# Patient Record
Sex: MALE | Race: BLACK OR AFRICAN AMERICAN | Employment: UNEMPLOYED | URBAN - METROPOLITAN AREA
[De-identification: names, ages, dates, MRNs, and addresses within clinical notes are randomized per-mention and may not be internally consistent; named-entity substitution may affect disease eponyms.]

---

## 2017-01-03 NOTE — TELEPHONE ENCOUNTER
Last Visit: 10/20/2016 with RENEA Roberts   Date of Surgery: 08/03/2016 revision of a failed left hip replacement     Next Appointment: 01/19/2017 with RENEA Roberts   Previous Refill Encounters: 12/07/2016 per RENEA Roberts #60     Requested Prescriptions     Pending Prescriptions Disp Refills    HYDROcodone-acetaminophen (NORCO)  mg tablet 60 Tab 0     Sig: Take 1-2 Tabs by mouth every eight (8) hours as needed for Pain. Max Daily Amount: 6 Tabs.

## 2017-01-04 RX ORDER — HYDROCODONE BITARTRATE AND ACETAMINOPHEN 10; 325 MG/1; MG/1
1-2 TABLET ORAL
Qty: 60 TAB | Refills: 0 | Status: SHIPPED | OUTPATIENT
Start: 2017-01-04 | End: 2017-01-19 | Stop reason: SDUPTHER

## 2017-01-19 ENCOUNTER — OFFICE VISIT (OUTPATIENT)
Dept: ORTHOPEDIC SURGERY | Facility: CLINIC | Age: 62
End: 2017-01-19

## 2017-01-19 VITALS
SYSTOLIC BLOOD PRESSURE: 144 MMHG | HEART RATE: 68 BPM | BODY MASS INDEX: 27.85 KG/M2 | DIASTOLIC BLOOD PRESSURE: 73 MMHG | HEIGHT: 69 IN | WEIGHT: 188 LBS | TEMPERATURE: 98.2 F

## 2017-01-19 DIAGNOSIS — Z96.642 HISTORY OF TOTAL LEFT HIP REPLACEMENT: Primary | ICD-10-CM

## 2017-01-19 RX ORDER — HYDROCODONE BITARTRATE AND ACETAMINOPHEN 10; 325 MG/1; MG/1
1-2 TABLET ORAL
Qty: 60 TAB | Refills: 0 | Status: SHIPPED | OUTPATIENT
Start: 2017-01-19 | End: 2017-02-20 | Stop reason: SDUPTHER

## 2017-01-19 RX ORDER — MELOXICAM 15 MG/1
TABLET ORAL
Qty: 30 TAB | Refills: 2 | Status: SHIPPED | OUTPATIENT
Start: 2017-01-19 | End: 2018-05-11

## 2017-01-19 RX ORDER — HYDROCODONE BITARTRATE AND ACETAMINOPHEN 5; 325 MG/1; MG/1
TABLET ORAL
COMMUNITY
End: 2017-08-10 | Stop reason: SDUPTHER

## 2017-01-19 NOTE — PROGRESS NOTES
[address], [address] [facility]  [phone]           Patient: Tiffanie Sandoval. MRN: [id number]       SSN: [id number]  YOB: 1955        AGE: 62 y.o. SEX: male  Body mass index is 27.76 kg/(m^2). PCP: Jorge Luis Okeefe MD  01/19/17      This office note has been dictated. REVIEW OF SYSTEMS:  Constitutional: Negative for fever, chills, weight loss and malaise/fatigue. HENT: Negative. Eyes: Negative. Respiratory: Negative. Cardiovascular: Negative. Gastrointestinal: No bowel incontinence or constipation. Genitourinary: No bladder incontinence or saddle anesthesia. Skin: Negative. Neurological: Negative. Endo/Heme/Allergies: Negative. Psychiatric/Behavioral: Negative. Musculoskeletal: As per HPI above. Past Medical History   Diagnosis Date    Arthritis     Balance problems     High cholesterol     Low back pain     Migraine     Right hip pain     Sleep apnea      does not use cpap         Current Outpatient Prescriptions:     HYDROcodone-acetaminophen (NORCO) 5-325 mg per tablet, Take  by mouth., Disp: , Rfl:     HYDROcodone-acetaminophen (NORCO)  mg tablet, Take 1-2 Tabs by mouth every eight (8) hours as needed for Pain. Max Daily Amount: 6 Tabs., Disp: 60 Tab, Rfl: 0    oxyCODONE-acetaminophen (PERCOCET 7.5) 7.5-325 mg per tablet, 1-2 po q 8 hrs prn pain, Disp: 60 Tab, Rfl: 0    ferrous sulfate 325 mg (65 mg iron) tablet, Take 1 Tab by mouth two (2) times daily (with meals). , Disp: 60 Tab, Rfl: 2    aspirin (ASPIRIN) 325 mg tablet, Take 1 Tab by mouth two (2) times a day., Disp: 60 Tab, Rfl: 0    No Known Allergies    Social History     Social History    Marital status:      Spouse name: N/A    Number of children: N/A    Years of education: N/A     Occupational History    Not on file.      Social History Main Topics    Smoking status: Never Smoker    Smokeless tobacco: Never Used    Alcohol use 3.6 oz/week     6 Cans of beer per week      Comment: week    Drug use: Yes     Special: Marijuana      Comment: last 7/19/2015    Sexual activity: Not on file     Other Topics Concern    Not on file     Social History Narrative       Past Surgical History   Procedure Laterality Date    Hx refractive surgery      Hx hip replacement Bilateral     Hx other surgical       Lumbar Compression    Hx orthopaedic       right hip replacement, left hip replacement    Hx heent       laser eye surgery left eye, prosthetic right eye               We did see Mr. Ty Rodriguez for follow-up in regards to revision left hip replacement. The patient is now five months status post surgery and doing quite well. He is quite happy with the results. He is having minimal discomfort, just some stiffness especially in the morning. He is currently taking no antiinflammatories. He has had no recent fevers, chills, systemic changes, or injuries to report. PHYSICAL EXAMINATION: In general the patient is alert and oriented x 3 and is in no acute distress. The patient is well-developed and well-nourished with a normal affect. The patient is afebrile. HEENT:  Head is normocephalic and atraumatic. Pupils are equally round and reactive to light and accommodation. Extraocular eye movements are intact. Neck is supple. Trachea is midline. No JVD is present. Breathing is nonlabored. Examination of the lower extremities reveals pain free range of motion of the hips. The surgical wound on the left hip has healed nicely. There is no pain with palpation to the trochanteric bursae. Abduction strength is 5/5 and symmetric bilaterally. There is negative calf tenderness and swelling. There is negative Homans. There is no evidence of DVT noted. Leg lengths are perfect. ASSESSMENT:  Status post revision left hip replacement.      PLAN:  At this point I am going to start him on Mobic once per day with food. He is given a refill of his pain medicine. We will see him back in seven months time for reevaluation and X-ray of the left hip. He will call with any questions or concerns that shall arise.              JR Roman RAMIREZ, PA-C, ATC

## 2017-02-20 DIAGNOSIS — Z96.642 HISTORY OF TOTAL LEFT HIP REPLACEMENT: ICD-10-CM

## 2017-02-20 NOTE — TELEPHONE ENCOUNTER
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 Max Daily Amount: 6 Tabs.

## 2017-02-22 RX ORDER — HYDROCODONE BITARTRATE AND ACETAMINOPHEN 10; 325 MG/1; MG/1
1-2 TABLET ORAL
Qty: 60 TAB | Refills: 0 | Status: SHIPPED | OUTPATIENT
Start: 2017-02-22 | End: 2017-04-14 | Stop reason: SDUPTHER

## 2017-03-17 RX ORDER — HYDROCODONE BITARTRATE AND ACETAMINOPHEN 10; 325 MG/1; MG/1
1-2 TABLET ORAL
Qty: 60 TAB | Refills: 0 | Status: SHIPPED | OUTPATIENT
Start: 2017-03-17 | End: 2018-01-23 | Stop reason: SDUPTHER

## 2017-03-17 NOTE — TELEPHONE ENCOUNTER
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 Max Daily Amount: 6 Tabs.

## 2017-04-14 DIAGNOSIS — Z96.642 HISTORY OF TOTAL LEFT HIP REPLACEMENT: ICD-10-CM

## 2017-04-14 RX ORDER — HYDROCODONE BITARTRATE AND ACETAMINOPHEN 10; 325 MG/1; MG/1
1-2 TABLET ORAL
Qty: 60 TAB | Refills: 0 | Status: SHIPPED | OUTPATIENT
Start: 2017-04-14 | End: 2017-05-03 | Stop reason: SDUPTHER

## 2017-04-14 NOTE — TELEPHONE ENCOUNTER
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 Max Daily Amount: 6 Tabs.

## 2017-04-14 NOTE — TELEPHONE ENCOUNTER
Attempted to inform pt of his Rx being ready for pickup at [facility] office.  Unable to leave a message

## 2017-05-03 DIAGNOSIS — Z96.642 HISTORY OF TOTAL LEFT HIP REPLACEMENT: ICD-10-CM

## 2017-05-03 RX ORDER — HYDROCODONE BITARTRATE AND ACETAMINOPHEN 10; 325 MG/1; MG/1
1-2 TABLET ORAL
Qty: 60 TAB | Refills: 0 | Status: SHIPPED | OUTPATIENT
Start: 2017-05-03 | End: 2017-05-24 | Stop reason: SDUPTHER

## 2017-05-03 NOTE — TELEPHONE ENCOUNTER
Last Visit: 01/19/2017 with RENEA Foreman   Date of Surgery: 08/03/2016 left hip revision  Next Appointment: 08/10/2017 with RENEA Foreman   Previous Refill Encounters: 04/14/2017 per RENEA Turcios #60     Requested Prescriptions     Pending Prescriptions Disp Refills    HYDROcodone-acetaminophen (NORCO)  mg tablet 60 Tab 0     Sig: Take 1-2 Tabs by mouth every eight (8) hours as needed for Pain. Max Daily Amount: 6 Tabs.

## 2017-05-24 DIAGNOSIS — Z96.642 HISTORY OF TOTAL LEFT HIP REPLACEMENT: ICD-10-CM

## 2017-05-24 NOTE — TELEPHONE ENCOUNTER
Last Visit: 01/19/2017 with RENEA Mccarthy   Date of Surgery: 08/03/2016 left hip revision  Next Appointment: 08/10/2017 with RENEA Mccarthy   Previous Refill Encounters: 05/03/2017 per RENEA Mccarthy #60     Requested Prescriptions     Pending Prescriptions Disp Refills    HYDROcodone-acetaminophen (NORCO)  mg tablet 60 Tab 0     Sig: Take 1-2 Tabs by mouth every eight (8) hours as needed for Pain. Max Daily Amount: 6 Tabs.

## 2017-05-25 RX ORDER — HYDROCODONE BITARTRATE AND ACETAMINOPHEN 10; 325 MG/1; MG/1
1-2 TABLET ORAL
Qty: 60 TAB | Refills: 0 | Status: SHIPPED | OUTPATIENT
Start: 2017-05-25 | End: 2017-06-26 | Stop reason: SDUPTHER

## 2017-06-26 DIAGNOSIS — Z96.642 HISTORY OF TOTAL LEFT HIP REPLACEMENT: ICD-10-CM

## 2017-06-26 NOTE — TELEPHONE ENCOUNTER
Last Visit: 01/19/2017 with RENEA Levin   Date of Surgery: 08/03/2016 left hip revision  Next Appointment: 08/10/2017 with RENEA Levin   Previous Refill Encounters: 05/25/2017 per RENEA Levin #60     Requested Prescriptions     Pending Prescriptions Disp Refills    HYDROcodone-acetaminophen (NORCO)  mg tablet 60 Tab 0     Sig: Take 1-2 Tabs by mouth every eight (8) hours as needed for Pain. Max Daily Amount: 6 Tabs.

## 2017-06-28 RX ORDER — HYDROCODONE BITARTRATE AND ACETAMINOPHEN 10; 325 MG/1; MG/1
1-2 TABLET ORAL
Qty: 60 TAB | Refills: 0 | Status: SHIPPED | OUTPATIENT
Start: 2017-06-28 | End: 2017-07-28 | Stop reason: SDUPTHER

## 2017-07-28 DIAGNOSIS — Z96.642 HISTORY OF TOTAL LEFT HIP REPLACEMENT: ICD-10-CM

## 2017-07-28 RX ORDER — HYDROCODONE BITARTRATE AND ACETAMINOPHEN 10; 325 MG/1; MG/1
1-2 TABLET ORAL
Qty: 21 TAB | Refills: 0 | Status: SHIPPED | OUTPATIENT
Start: 2017-07-28 | End: 2018-01-23 | Stop reason: SDUPTHER

## 2017-07-28 NOTE — TELEPHONE ENCOUNTER
Last Visit: 01/19/2017 with RENEA Quintana   Date of Surgery: 08/03/2016 left hip revision  Next Appointment: 08/10/2017 with RENEA Quintana   Previous Refill Encounters: 06/28/2017 per RENEA Martin #60     Requested Prescriptions     Pending Prescriptions Disp Refills    HYDROcodone-acetaminophen (NORCO)  mg tablet 60 Tab 0     Sig: Take 1-2 Tabs by mouth every eight (8) hours as needed for Pain. Max Daily Amount: 6 Tabs.

## 2017-07-28 NOTE — TELEPHONE ENCOUNTER
Notify patient only able to provide a one week rx for pain meds. Notify patient we will be unable to continue with narcotic refills due to federal regulatory laws. Can refer to pain management.

## 2017-07-28 NOTE — TELEPHONE ENCOUNTER
Called and spoke to pt. He can come to the [facility] office for rx and he is aware we can not prescribe anymore refills.

## 2017-08-10 ENCOUNTER — OFFICE VISIT (OUTPATIENT)
Dept: ORTHOPEDIC SURGERY | Facility: CLINIC | Age: 62
End: 2017-08-10

## 2017-08-10 VITALS
HEIGHT: 69 IN | OXYGEN SATURATION: 100 % | SYSTOLIC BLOOD PRESSURE: 147 MMHG | TEMPERATURE: 98.1 F | RESPIRATION RATE: 18 BRPM | BODY MASS INDEX: 26.78 KG/M2 | WEIGHT: 180.8 LBS | HEART RATE: 51 BPM | DIASTOLIC BLOOD PRESSURE: 77 MMHG

## 2017-08-10 DIAGNOSIS — Z96.642 S/P HIP REPLACEMENT, LEFT: Primary | ICD-10-CM

## 2017-08-10 DIAGNOSIS — Z96.641 HISTORY OF TOTAL RIGHT HIP REPLACEMENT: ICD-10-CM

## 2017-08-10 RX ORDER — HYDROCODONE BITARTRATE AND ACETAMINOPHEN 5; 325 MG/1; MG/1
1 TABLET ORAL
Qty: 30 TAB | Refills: 0 | Status: SHIPPED | OUTPATIENT
Start: 2017-08-10 | End: 2017-09-21 | Stop reason: SDUPTHER

## 2017-08-10 NOTE — PROGRESS NOTES
[address], [address] [place] [address]  [phone]           Patient: Tito Burciaga. MRN: [id number]       SSN: [id number]  YOB: 1955        AGE: 62 y.o. SEX: male  Body mass index is 26.7 kg/(m^2). PCP: Rodriguez Henderson MD  08/10/17      This office note has been dictated. REVIEW OF SYSTEMS:  Constitutional: Negative for fever, chills, weight loss and malaise/fatigue. HENT: Negative. Eyes: Negative. Respiratory: Negative. Cardiovascular: Negative. Gastrointestinal: No bowel incontinence or constipation. Genitourinary: No bladder incontinence or saddle anesthesia. Skin: Negative. Neurological: Negative. Endo/Heme/Allergies: Negative. Psychiatric/Behavioral: Negative. Musculoskeletal: As per HPI above. Past Medical History:   Diagnosis Date    Arthritis     Balance problems     High cholesterol     Low back pain     Migraine     Right hip pain     Sleep apnea     does not use cpap         Current Outpatient Prescriptions:     HYDROcodone-acetaminophen (NORCO) 5-325 mg per tablet, Take 1 Tab by mouth every six (6) hours as needed for Pain. Max Daily Amount: 4 Tabs., Disp: 30 Tab, Rfl: 0    aspirin (ASPIRIN) 325 mg tablet, Take 1 Tab by mouth two (2) times a day., Disp: 60 Tab, Rfl: 0    HYDROcodone-acetaminophen (NORCO)  mg tablet, Take 1-2 Tabs by mouth every eight (8) hours as needed for Pain. Max Daily Amount: 6 Tabs., Disp: 21 Tab, Rfl: 0    HYDROcodone-acetaminophen (NORCO)  mg tablet, Take 1-2 Tabs by mouth every eight (8) hours as needed for Pain.  Max Daily Amount: 6 Tabs., Disp: 60 Tab, Rfl: 0    meloxicam (MOBIC) 15 mg tablet, 1 tab by mouth daily with food, Disp: 30 Tab, Rfl: 2    oxyCODONE-acetaminophen (PERCOCET 7.5) 7.5-325 mg per tablet, 1-2 po q 8 hrs prn pain, Disp: 60 Tab, Rfl: 0    ferrous sulfate 325 mg (65 mg iron) tablet, Take 1 Tab by mouth two (2) times daily (with meals). , Disp: 60 Tab, Rfl: 2    No Known Allergies    Social History     Social History    Marital status:      Spouse name: N/A    Number of children: N/A    Years of education: N/A     Occupational History    Not on file. Social History Main Topics    Smoking status: Never Smoker    Smokeless tobacco: Never Used    Alcohol use 3.6 oz/week     6 Cans of beer per week      Comment: week    Drug use: Yes     Special: Marijuana      Comment: last 7/19/2015    Sexual activity: Not on file     Other Topics Concern    Not on file     Social History Narrative       Past Surgical History:   Procedure Laterality Date    HX HEENT      laser eye surgery left eye, prosthetic right eye    HX HIP REPLACEMENT Bilateral     HX ORTHOPAEDIC      right hip replacement, left hip replacement    HX OTHER SURGICAL      Lumbar Compression    HX REFRACTIVE SURGERY                 We did see Mr. Jesus Purcell for followup with regards to his bilateral hips. He is status post bilateral hip replacement. We did a revision of the left one about one year ago for poly exchange. He is doing extremely well. He is very happy with the results and having no pain. He takes about one-half of a pain pill at night. He is out doing his normal daily activities without complications. PHYSICAL EXAMINATION: In general, the patient is alert and oriented x 3 and is in no acute distress. The patient is well-developed and well-nourished with a normal affect. The patient is afebrile. HEENT:  Head is normocephalic and atraumatic. Pupils are equally round and reactive to light and accommodation. Extraocular eye movements are intact. Neck is supple. Trachea is midline. No JVD is present. Breathing is nonlabored. Examination of the lower extremities reveals pain-free range of motion of the hips. There is no pain to palpation of the trochanteric bursae.  There is a negative straight leg raise, negative calf tenderness, and negative Isabellas sign. There are no signs of DVT present. Abduction strength is symmetric bilaterally at 5/5. RADIOGRAPHS:  Radiographs in the office today, AP of the pelvis and AP and cross table of each of the hips show total knee components are well-fixed with no evidence of loosening or fracture noted. He does have some heterotopic ossification of the greater trochanter seen on the left hip. ASSESSMENT:      1. Status post right hip replacement. 2. Status post left hip replacement. 3. Status post left hip revision with poly exchange. PLAN:  At this point, the patient has done extremely well. He will continue activities as tolerated. He was given a prescription for Norco, one every six hours #30 with no refills. He will see us back in one years time for evaluation. He will call with any questions or concerns that shall arise.            JR Roman RAMIREZ, PATyreeC, ATC

## 2017-09-21 ENCOUNTER — OFFICE VISIT (OUTPATIENT)
Dept: ORTHOPEDIC SURGERY | Age: 62
End: 2017-09-21

## 2017-09-21 VITALS
HEART RATE: 60 BPM | HEIGHT: 69 IN | BODY MASS INDEX: 26.96 KG/M2 | TEMPERATURE: 97 F | SYSTOLIC BLOOD PRESSURE: 136 MMHG | WEIGHT: 182 LBS | DIASTOLIC BLOOD PRESSURE: 82 MMHG

## 2017-09-21 DIAGNOSIS — Z96.642 S/P HIP REPLACEMENT, LEFT: ICD-10-CM

## 2017-09-21 DIAGNOSIS — M70.61 TROCHANTERIC BURSITIS OF BOTH HIPS: Primary | ICD-10-CM

## 2017-09-21 DIAGNOSIS — M70.62 TROCHANTERIC BURSITIS OF BOTH HIPS: Primary | ICD-10-CM

## 2017-09-21 RX ORDER — HYDROCODONE BITARTRATE AND ACETAMINOPHEN 5; 325 MG/1; MG/1
1 TABLET ORAL
Qty: 30 TAB | Refills: 0 | Status: SHIPPED | OUTPATIENT
Start: 2017-09-21 | End: 2017-11-03 | Stop reason: SDUPTHER

## 2017-09-21 RX ORDER — IBUPROFEN 200 MG
400 TABLET ORAL
COMMUNITY

## 2017-09-21 RX ORDER — ATORVASTATIN CALCIUM 40 MG/1
1 TABLET, FILM COATED ORAL
COMMUNITY
Start: 2017-08-25

## 2017-09-21 RX ORDER — CYCLOBENZAPRINE HCL 10 MG
10 TABLET ORAL
Qty: 60 TAB | Refills: 0 | Status: SHIPPED | OUTPATIENT
Start: 2017-09-21 | End: 2018-05-11

## 2017-09-21 RX ORDER — BETAMETHASONE SODIUM PHOSPHATE AND BETAMETHASONE ACETATE 3; 3 MG/ML; MG/ML
6 INJECTION, SUSPENSION INTRA-ARTICULAR; INTRALESIONAL; INTRAMUSCULAR; SOFT TISSUE ONCE
Qty: 2 ML | Refills: 0
Start: 2017-09-21 | End: 2017-09-21
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
## 2017-11-03 DIAGNOSIS — Z96.642 S/P HIP REPLACEMENT, LEFT: ICD-10-CM

## 2017-11-03 RX ORDER — HYDROCODONE BITARTRATE AND ACETAMINOPHEN 5; 325 MG/1; MG/1
1 TABLET ORAL
Qty: 30 TAB | Refills: 0 | Status: SHIPPED | OUTPATIENT
Start: 2017-11-03 | End: 2018-01-12 | Stop reason: SDUPTHER

## 2017-11-03 NOTE — TELEPHONE ENCOUNTER
Last Visit: 09/21/2017 with RENEA Laguerre    Next Appointment: noted to f/u in 3 months   Previous Refill Encounters: 09/21/2017 per RENEA Laguerre #30     Requested Prescriptions     Pending Prescriptions Disp Refills    HYDROcodone-acetaminophen (NORCO) 5-325 mg per tablet 30 Tab 0     Sig: Take 1 Tab by mouth every six (6) hours as needed for Pain. Max Daily Amount: 4 Tabs.

## 2017-12-06 DIAGNOSIS — Z96.642 S/P HIP REPLACEMENT, LEFT: ICD-10-CM

## 2017-12-06 RX ORDER — HYDROCODONE BITARTRATE AND ACETAMINOPHEN 5; 325 MG/1; MG/1
1 TABLET ORAL
Qty: 30 TAB | Refills: 0 | Status: CANCELLED | OUTPATIENT
Start: 2017-12-06

## 2017-12-27 RX ORDER — HYDROCODONE BITARTRATE AND ACETAMINOPHEN 5; 325 MG/1; MG/1
1 TABLET ORAL
Qty: 30 TAB | Refills: 0 | OUTPATIENT
Start: 2017-12-27

## 2017-12-27 NOTE — TELEPHONE ENCOUNTER
Last Visit: 09/21/2017 with RENEA Thompson    Next Appointment: noted to f/u in 3 months   Previous Refill Encounters: 11/03/2017 per RENEA Burciaga #30     Requested Prescriptions     Pending Prescriptions Disp Refills    HYDROcodone-acetaminophen (NORCO) 5-325 mg per tablet 30 Tab 0     Sig: Take 1 Tab by mouth every six (6) hours as needed for Pain. Max Daily Amount: 4 Tabs.

## 2018-01-12 ENCOUNTER — OFFICE VISIT (OUTPATIENT)
Dept: ORTHOPEDIC SURGERY | Age: 63
End: 2018-01-12

## 2018-01-12 VITALS
OXYGEN SATURATION: 98 % | HEIGHT: 69 IN | DIASTOLIC BLOOD PRESSURE: 67 MMHG | SYSTOLIC BLOOD PRESSURE: 142 MMHG | WEIGHT: 183.8 LBS | HEART RATE: 67 BPM | BODY MASS INDEX: 27.22 KG/M2

## 2018-01-12 DIAGNOSIS — Z96.642 S/P HIP REPLACEMENT, LEFT: ICD-10-CM

## 2018-01-12 DIAGNOSIS — M70.62 TROCHANTERIC BURSITIS OF LEFT HIP: Primary | ICD-10-CM

## 2018-01-12 DIAGNOSIS — M54.5 LOW BACK PAIN, UNSPECIFIED BACK PAIN LATERALITY, UNSPECIFIED CHRONICITY, WITH SCIATICA PRESENCE UNSPECIFIED: ICD-10-CM

## 2018-01-12 DIAGNOSIS — Z96.641 HISTORY OF TOTAL RIGHT HIP REPLACEMENT: ICD-10-CM

## 2018-01-12 DIAGNOSIS — Z96.642 HISTORY OF TOTAL LEFT HIP REPLACEMENT: ICD-10-CM

## 2018-01-12 RX ORDER — BETAMETHASONE SODIUM PHOSPHATE AND BETAMETHASONE ACETATE 3; 3 MG/ML; MG/ML
6 INJECTION, SUSPENSION INTRA-ARTICULAR; INTRALESIONAL; INTRAMUSCULAR; SOFT TISSUE ONCE
Qty: 1 ML | Refills: 0
Start: 2018-01-12 | End: 2018-01-12

## 2018-01-12 RX ORDER — HYDROCODONE BITARTRATE AND ACETAMINOPHEN 5; 325 MG/1; MG/1
1-2 TABLET ORAL
Qty: 42 TAB | Refills: 0 | Status: SHIPPED | OUTPATIENT
Start: 2018-01-12 | End: 2018-04-13 | Stop reason: SDUPTHER

## 2018-01-12 NOTE — PROGRESS NOTES
[address] [place] [address]  [phone]           Patient: Candace Almonte. MRN: [id number]       SSN: [id number]  YOB: 1955        AGE: 65 y.o. SEX: male  Body mass index is 27.14 kg/(m^2). PCP: Anthony Horner MD  01/12/18      This office note has been dictated. REVIEW OF SYSTEMS:  Constitutional: Negative for fever, chills, weight loss and malaise/fatigue. HENT: Negative. Eyes: Negative. Respiratory: Negative. Cardiovascular: Negative. Gastrointestinal: No bowel incontinence or constipation. Genitourinary: No bladder incontinence or saddle anesthesia. Skin: Negative. Neurological: Negative. Endo/Heme/Allergies: Negative. Psychiatric/Behavioral: Negative. Musculoskeletal: As per HPI above. Past Medical History:   Diagnosis Date    Arthritis     Balance problems     High cholesterol     Low back pain     Migraine     Right hip pain     Sleep apnea     does not use cpap         Current Outpatient Prescriptions:     HYDROcodone-acetaminophen (NORCO) 5-325 mg per tablet, Take 1 Tab by mouth every six (6) hours as needed for Pain. Max Daily Amount: 4 Tabs., Disp: 30 Tab, Rfl: 0    ibuprofen (MOTRIN) 200 mg tablet, Take 400 mg by mouth every six (6) hours as needed for Pain., Disp: , Rfl:     atorvastatin (LIPITOR) 40 mg tablet, Take 1 Tab by mouth., Disp: , Rfl:     cyclobenzaprine (FLEXERIL) 10 mg tablet, Take 1 Tab by mouth three (3) times daily as needed for Muscle Spasm(s). , Disp: 60 Tab, Rfl: 0    HYDROcodone-acetaminophen (NORCO)  mg tablet, Take 1-2 Tabs by mouth every eight (8) hours as needed for Pain. Max Daily Amount: 6 Tabs., Disp: 21 Tab, Rfl: 0    HYDROcodone-acetaminophen (NORCO)  mg tablet, Take 1-2 Tabs by mouth every eight (8) hours as needed for Pain.  Max Daily Amount: 6 Tabs., Disp: 60 Tab, Rfl: 0    meloxicam (MOBIC) 15 mg tablet, 1 tab by mouth daily with food, Disp: 30 Tab, Rfl: 2    oxyCODONE-acetaminophen (PERCOCET 7.5) 7.5-325 mg per tablet, 1-2 po q 8 hrs prn pain, Disp: 60 Tab, Rfl: 0    ferrous sulfate 325 mg (65 mg iron) tablet, Take 1 Tab by mouth two (2) times daily (with meals). , Disp: 60 Tab, Rfl: 2    aspirin (ASPIRIN) 325 mg tablet, Take 1 Tab by mouth two (2) times a day., Disp: 60 Tab, Rfl: 0    No Known Allergies    Social History     Social History    Marital status:      Spouse name: N/A    Number of children: N/A    Years of education: N/A     Occupational History    Not on file. Social History Main Topics    Smoking status: Never Smoker    Smokeless tobacco: Never Used    Alcohol use 3.6 oz/week     6 Cans of beer per week      Comment: week    Drug use: Yes     Special: Marijuana      Comment: last 7/19/2015    Sexual activity: Not on file     Other Topics Concern    Not on file     Social History Narrative       Past Surgical History:   Procedure Laterality Date    HX HEENT      laser eye surgery left eye, prosthetic right eye    HX HIP REPLACEMENT Bilateral     HX ORTHOPAEDIC      right hip replacement, left hip replacement    HX OTHER SURGICAL      Lumbar Compression    HX REFRACTIVE SURGERY             * Patient was identified by name and date of birth   * Agreement on procedure being performed was verified  * Risks and Benefits explained to the patient  * Procedure site verified and marked as necessary  * Patient was positioned for comfort  * Consent was signed and verified  11:14 AM    The patient was instructed on post injection care. We did see Mr. Ewing Go for followup with regards to complaints of left hip pain. The patient reports a little laterally-based left hip pain but more thigh pain when he is up and ambulating. The further he walks, the more discomfort he gets. He also gets discomfort standing at the kitchen sink doing dishes and cooking.   He has some discomfort at night lying down. He has no pain with sitting. He denies any change in his bowel or bladder habits, and no he has had no fevers, chills, systemic changes, or injuries to report. He does continue on Norco and would like a refill of it. PHYSICAL EXAMINATION:  In general, the patient is alert and oriented x 3 in no acute distress. The patient is well-developed, well-nourished, with a normal affect. The patient is afebrile. HEENT:  Head is normocephalic and atraumatic. Pupils are equally round and reactive to light and accommodation. Extraocular eye movements are intact. Neck is supple. Trachea is midline. No JVD is present. Breathing is nonlabored. Examination of the back reveals the skin is intact. There is no ecchymosis, no warmth, and no signs of infection or cellulitis present. There is no pain with palpation of the midline lumbar spine, as well as paraspinally. The hips have good range of motion without discomfort. There is discomfort to palpation to the trochanteric bursa on the left side. The surgical wounds are healed up nicely. There is no ecchymosis, no warmth, and no signs of infection or cellulitis present. There is negative cellulitis, negative straight leg raise, and negative calf tenderness. He has slight decreased sensation to L2-3 to the left lower extremity. RADIOGRAPHS:  Radiographs in the office today, including AP and lateral of the lumbar spine, shows multilevel degenerative changes. She does have a history of an L2 compression fracture. She does have some degenerative disc disease noted as well at multiple levels. AP of the pelvis and AP and cross table lateral of the left hip, shows total knee components are well-fixed. He does have some bridging osteophyte, heterotopic ossification off the greater trochanter to the inferior iliac spine. It is not fully ossified across. No acute abnormality is noted.      ASSESSMENT:      1. Status post revision left knee replacement. 2. Trochanteric bursitis left hip. 3. Lumbar radiculopathy. PLAN:  At this point, we discussed treatment options. We are going to move forward with a cortisone injection for the left hip today. Under aseptic conditions, after informed and written consent, and appropriate time out performed, with ultrasound-guided assistance, the left hip was prepped with Betadine and 6 mg of Celestone was injected without complications. The patient tolerated the injection well. The patient was instructed on post injection care. We will get him back to see Dr. Kaylah Menon with regards to his back. I think he would benefit from repeat epidural steroid injections. I think that half of his discomfort is coming from radiculopathy. He was given a refill of his pain medicine today in the office. We will see him back in about three months time evaluation.                           JR Roman RAMIREZ, PATyreeC, ATC

## 2018-01-23 ENCOUNTER — OFFICE VISIT (OUTPATIENT)
Dept: ORTHOPEDIC SURGERY | Age: 63
End: 2018-01-23

## 2018-01-23 VITALS
SYSTOLIC BLOOD PRESSURE: 134 MMHG | HEIGHT: 69 IN | DIASTOLIC BLOOD PRESSURE: 78 MMHG | HEART RATE: 62 BPM | BODY MASS INDEX: 26.22 KG/M2 | WEIGHT: 177 LBS

## 2018-01-23 DIAGNOSIS — M47.817 LUMBOSACRAL SPONDYLOSIS WITHOUT MYELOPATHY: Primary | ICD-10-CM

## 2018-01-23 DIAGNOSIS — M51.36 DEGENERATIVE LUMBAR DISC: ICD-10-CM

## 2018-01-23 DIAGNOSIS — M51.36 OTHER INTERVERTEBRAL DISC DEGENERATION, LUMBAR REGION: ICD-10-CM

## 2018-01-23 RX ORDER — GABAPENTIN 300 MG/1
300 CAPSULE ORAL 3 TIMES DAILY
Qty: 90 CAP | Refills: 1 | Status: SHIPPED | OUTPATIENT
Start: 2018-01-23 | End: 2018-05-11

## 2018-01-23 NOTE — MR AVS SNAPSHOT
[facility] 
 
 
 [address] [facility] 
[phone] Patient: Zeferino Mckeon. MRN: [id number] :1955 Visit Information Date & Time Provider Department Dept. Phone Encounter #  
 2018  8:35 AM Eleuterio Gould  [facility], [address] [facility] [place] [phone] [id number] Follow-up Instructions Return in about 4 weeks (around 2018). Your Appointments 2018 10:30 AM  
Follow Up with Stefano Allen PA-C  
[facility] 1 ([facility]) Appt Note: lt hip 3 mo fu  
 [address], [address] [facility] [address]  
[phone] [address] [facility] Upcoming Health Maintenance Date Due Hepatitis C Screening 1955 DTaP/Tdap/Td series (1 - Tdap) 1977 FOBT Q 1 YEAR AGE 50-75 2006 ZOSTER VACCINE AGE 60> 10/12/2016 Influenza Age 5 to Adult 2017 Allergies as of 2018  Review Complete On: 2018 By: Eleuterio Gould MD  
 No Known Allergies Current Immunizations  Never Reviewed No immunizations on file. Not reviewed this visit You Were Diagnosed With   
  
 Codes Comments Lumbosacral spondylosis without myelopathy    -  Primary ICD-10-CM: I38.592 ICD-9-CM: 721.3 Degenerative lumbar disc     ICD-10-CM: M51.36 
ICD-9-CM: 722.52 Other intervertebral disc degeneration, lumbar region     ICD-10-CM: M51.36 
ICD-9-CM: 722.52 Vitals BP Pulse Height(growth percentile) Weight(growth percentile) BMI Smoking Status 134/78 62 5' 9\" (1.753 m) 177 lb (80.3 kg) 26.14 kg/m2 Never Smoker Vitals History BMI and BSA Data Body Mass Index Body Surface Area  
 26.14 kg/m 2 1.98 m 2 Preferred Pharmacy Pharmacy Name Phone  [id number] [phone] Your Updated Medication List  
  
   
This list is accurate as of: 1/23/18  9:47 AM.  Always use your most recent med list.  
  
  
  
  
 aspirin 325 mg tablet Commonly known as:  ASPIRIN Take 1 Tab by mouth two (2) times a day. cyclobenzaprine 10 mg tablet Commonly known as:  FLEXERIL Take 1 Tab by mouth three (3) times daily as needed for Muscle Spasm(s). ferrous sulfate 325 mg (65 mg iron) tablet Take 1 Tab by mouth two (2) times daily (with meals). gabapentin 300 mg capsule Commonly known as:  NEURONTIN Take 1 Cap by mouth three (3) times daily. HYDROcodone-acetaminophen 5-325 mg per tablet Commonly known as:  Janine Punt Take 1-2 Tabs by mouth every eight (8) hours as needed for Pain. Max Daily Amount: 6 Tabs. ibuprofen 200 mg tablet Commonly known as:  MOTRIN Take 400 mg by mouth every six (6) hours as needed for Pain. LIPITOR 40 mg tablet Generic drug:  atorvastatin Take 1 Tab by mouth.  
  
 meloxicam 15 mg tablet Commonly known as:  MOBIC  
1 tab by mouth daily with food Prescriptions Sent to Pharmacy Refills  
 gabapentin (NEURONTIN) 300 mg capsule 1 Sig: Take 1 Cap by mouth three (3) times daily. Class: Normal  
 [facility] [facility] [phone] Route: Oral  
  
Follow-up Instructions Return in about 4 weeks (around 2/20/2018). Introducing [address]! Justina Hua introduces [employer] patient portal. Now you can access parts of your medical record, email your doctor's office, and request medication refills online. 1. In your internet browser, go to [email or web address] 2. Click on the First Time User? Click Here link in the Sign In box. You will see the New Member Sign Up page. 3. Enter your [employer] Access Code exactly as it appears below.  You will not need to use this code after youve completed the sign-up process. If you do not sign up before the expiration date, you must request a new code. · [employer] Access Code: [email or web address] Expires: 4/12/2018 10:45 AM 
 
4. Enter the last four digits of your Social Security Number (xxxx) and Date of Birth (mm/dd/yyyy) as indicated and click Submit. You will be taken to the next sign-up page. 5. Create a [employer] ID. This will be your [employer] login ID and cannot be changed, so think of one that is secure and easy to remember. 6. Create a [employer] password. You can change your password at any time. 7. Enter your Password Reset Question and Answer. This can be used at a later time if you forget your password. 8. Enter your e-mail address. You will receive e-mail notification when new information is available in [address] 9. Click Sign Up. You can now view and download portions of your medical record. 10. Click the Download Summary menu link to download a portable copy of your medical information. If you have questions, please visit the Frequently Asked Questions section of the [employer] website. Remember, [employer] is NOT to be used for urgent needs. For medical emergencies, dial 911. Now available from your iPhone and Android! Please provide this summary of care documentation to your next provider. Your primary care clinician is listed as Dionicio James. If you have any questions after today's visit, please call [phone].

## 2018-01-23 NOTE — PROGRESS NOTES
[facility]  [address], [place]   Phone: [phone]  Fax: [phone]        PROGRESS NOTE      HISTORY OF PRESENT ILLNESS:  The patient is a 65 y.o. male and was seen today for follow up of low back pain extending in roughly an L5 distribution into the bilateral lower extremities. He endorses low back pain first thing in the morning. The patient states he is doing well. He is followed by James Duong. Wayne MEIER for bilateral total hip replacement. Note from Dr. Yajaira Fontaine dated 3/14/16 indicates patient needs a new left hip replacement. Patient states he plans to have left total hip replacement revision done after July. Note from Beth Mcdonnell dated 10/20/2016 reveals pt had follow up revision of left total hip replacement on 8/3/2016. Pt reports that since the hip replacement his pain has improved. Patient is prescribed Norco by Dr. Yajaira Fontaine. Pt states he stopped his Neurontin after his hip surgery and is doing well off the medication. He has completed PT. MRI dated 12/18/12. Per report revealed degenerative changes in the lumbar spine remaining relatively stable, most pronounced at L4-L5 where there may be mass effect upon the crossing L5 nerve roots bilaterally and moderate left-sided foraminal stenosis. At his last clinic appointment, pt discontinued his Neurontin 3 months ago without any increase in pain. He may continue off his medication. I recommend pt increase his HEP to daily. I will see the patient back PRN     The patient returns today with left hip pain extending circumferentially into the  LLE to the knee since January 2018. He rates pain 7/10, an increase since his last visit (4/10). No injury/trauma. He states his pain onset due to the recent cold weather. His pain is aggravated with elongated standing and alleviated when sitting down. Note from Jesus Oreilly dated 1/15/18 reviewed. His note indicated patient was seen with c/o left hip pain.  At that time he underwent a left trochanteric injection without benefit. Patient reports he will f/u with RENEA Srivastava in 3 months. He continues with his HEP 3x/wek.  reviewed. Body mass index is 26.14 kg/(m^2). Past Medical History:   Diagnosis Date    Arthritis     Balance problems     High cholesterol     Low back pain     Migraine     Right hip pain     Sleep apnea     does not use cpap        Social History     Social History    Marital status:      Spouse name: N/A    Number of children: N/A    Years of education: N/A     Occupational History    Not on file. Social History Main Topics    Smoking status: Never Smoker    Smokeless tobacco: Never Used    Alcohol use 3.6 oz/week     6 Cans of beer per week      Comment: week    Drug use: Yes     Special: Marijuana      Comment: last 7/19/2015    Sexual activity: Not on file     Other Topics Concern    Not on file     Social History Narrative       Current Outpatient Prescriptions   Medication Sig Dispense Refill    gabapentin (NEURONTIN) 300 mg capsule Take 1 Cap by mouth three (3) times daily. 90 Cap 1    HYDROcodone-acetaminophen (NORCO) 5-325 mg per tablet Take 1-2 Tabs by mouth every eight (8) hours as needed for Pain. Max Daily Amount: 6 Tabs. 42 Tab 0    ibuprofen (MOTRIN) 200 mg tablet Take 400 mg by mouth every six (6) hours as needed for Pain.  atorvastatin (LIPITOR) 40 mg tablet Take 1 Tab by mouth.  meloxicam (MOBIC) 15 mg tablet 1 tab by mouth daily with food 30 Tab 2    ferrous sulfate 325 mg (65 mg iron) tablet Take 1 Tab by mouth two (2) times daily (with meals). 60 Tab 2    aspirin (ASPIRIN) 325 mg tablet Take 1 Tab by mouth two (2) times a day. 60 Tab 0    cyclobenzaprine (FLEXERIL) 10 mg tablet Take 1 Tab by mouth three (3) times daily as needed for Muscle Spasm(s). (Patient not taking: Reported on 1/23/2018) 60 Tab 0       No Known Allergies     Ambulates with a single point cane.   PHYSICAL EXAMINATION    Visit Vitals    /78    Pulse 62    Ht 5' 9\" (1.753 m)    Wt 177 lb (80.3 kg)    BMI 26.14 kg/m2       CONSTITUTIONAL: NAD, A&O x 3  SENSATION: Intact to light touch throughout  NEURO: Franki's is negative bilaterally. RANGE OF MOTION: The patient has full passive range of motion in all four extremities. MOTOR:  Straight Leg Raise: Negative, bilateral               Hip Flex Knee Ext Knee Flex Ankle DF GTE Ankle PF Tone   Right +4/5 +4/5 +4/5 +4/5 +4/5 +4/5 +4/5   Left +4/5 +4/5 +4/5 +4/5 +4/5 +4/5 +4/5       ASSESSMENT   Diagnoses and all orders for this visit:    1. Lumbosacral spondylosis without myelopathy    2. Degenerative lumbar disc    3. Other intervertebral disc degeneration, lumbar region    Other orders  -     gabapentin (NEURONTIN) 300 mg capsule; Take 1 Cap by mouth three (3) times daily. IMPRESSION AND PLAN:  I will try him on Neurontin 300 mg TID. The risks, benefits, and potential side effects of this medication were discussed. Patient understands and wishes to proceed. Patient advised to call the office if intolerant to new medication. I encourage patient to perform his HEP daily. I will see the patient back in 1 month's time or earlier if needed. Written by Jessy Quinones, as dictated by Lovely Pennington MD  I examined the patient, reviewed and agree with the note.

## 2018-04-13 ENCOUNTER — OFFICE VISIT (OUTPATIENT)
Dept: ORTHOPEDIC SURGERY | Age: 63
End: 2018-04-13

## 2018-04-13 VITALS
BODY MASS INDEX: 25.86 KG/M2 | SYSTOLIC BLOOD PRESSURE: 144 MMHG | HEIGHT: 69 IN | DIASTOLIC BLOOD PRESSURE: 77 MMHG | WEIGHT: 174.6 LBS | OXYGEN SATURATION: 98 % | HEART RATE: 76 BPM

## 2018-04-13 DIAGNOSIS — Z96.642 S/P HIP REPLACEMENT, LEFT: ICD-10-CM

## 2018-04-13 DIAGNOSIS — M17.12 PRIMARY OSTEOARTHRITIS OF LEFT KNEE: ICD-10-CM

## 2018-04-13 DIAGNOSIS — M70.62 TROCHANTERIC BURSITIS OF LEFT HIP: ICD-10-CM

## 2018-04-13 DIAGNOSIS — M25.562 LEFT KNEE PAIN, UNSPECIFIED CHRONICITY: Primary | ICD-10-CM

## 2018-04-13 RX ORDER — BETAMETHASONE SODIUM PHOSPHATE AND BETAMETHASONE ACETATE 3; 3 MG/ML; MG/ML
6 INJECTION, SUSPENSION INTRA-ARTICULAR; INTRALESIONAL; INTRAMUSCULAR; SOFT TISSUE ONCE
Qty: 1 ML | Refills: 0
Start: 2018-04-13 | End: 2018-04-13

## 2018-04-13 RX ORDER — HYDROCODONE BITARTRATE AND ACETAMINOPHEN 5; 325 MG/1; MG/1
1 TABLET ORAL
Qty: 28 TAB | Refills: 0 | Status: SHIPPED | OUTPATIENT
Start: 2018-04-13 | End: 2018-05-11 | Stop reason: SDUPTHER

## 2018-04-13 NOTE — PROGRESS NOTES
[address] [place] [address]  [phone]           Patient: Sagrario Valdez. MRN: [id number]       SSN: [id number]  YOB: 1955        AGE: 65 y.o. SEX: male  Body mass index is 25.78 kg/(m^2). PCP: Shahab yRan MD  04/13/18      This office note has been dictated. REVIEW OF SYSTEMS:  Constitutional: Negative for fever, chills, weight loss and malaise/fatigue. HENT: Negative. Eyes: Negative. Respiratory: Negative. Cardiovascular: Negative. Gastrointestinal: No bowel incontinence or constipation. Genitourinary: No bladder incontinence or saddle anesthesia. Skin: Negative. Neurological: Negative. Endo/Heme/Allergies: Negative. Psychiatric/Behavioral: Negative. Musculoskeletal: As per HPI above. Past Medical History:   Diagnosis Date    Arthritis     Balance problems     High cholesterol     Low back pain     Migraine     Right hip pain     Sleep apnea     does not use cpap         Current Outpatient Prescriptions:     gabapentin (NEURONTIN) 300 mg capsule, Take 1 Cap by mouth three (3) times daily. , Disp: 90 Cap, Rfl: 1    ibuprofen (MOTRIN) 200 mg tablet, Take 400 mg by mouth every six (6) hours as needed for Pain., Disp: , Rfl:     atorvastatin (LIPITOR) 40 mg tablet, Take 1 Tab by mouth., Disp: , Rfl:     cyclobenzaprine (FLEXERIL) 10 mg tablet, Take 1 Tab by mouth three (3) times daily as needed for Muscle Spasm(s). , Disp: 60 Tab, Rfl: 0    ferrous sulfate 325 mg (65 mg iron) tablet, Take 1 Tab by mouth two (2) times daily (with meals). , Disp: 60 Tab, Rfl: 2    aspirin (ASPIRIN) 325 mg tablet, Take 1 Tab by mouth two (2) times a day., Disp: 60 Tab, Rfl: 0    HYDROcodone-acetaminophen (NORCO) 5-325 mg per tablet, Take 1-2 Tabs by mouth every eight (8) hours as needed for Pain.  Max Daily Amount: 6 Tabs., Disp: 42 Tab, Rfl: 0    meloxicam (MOBIC) 15 mg tablet, 1 tab by mouth daily with food, Disp: 30 Tab, Rfl: 2    No Known Allergies    Social History     Social History    Marital status:      Spouse name: N/A    Number of children: N/A    Years of education: N/A     Occupational History    Not on file. Social History Main Topics    Smoking status: Never Smoker    Smokeless tobacco: Never Used    Alcohol use 3.6 oz/week     6 Cans of beer per week      Comment: week    Drug use: Yes     Special: Marijuana      Comment: last 7/19/2015    Sexual activity: Not on file     Other Topics Concern    Not on file     Social History Narrative       Past Surgical History:   Procedure Laterality Date    HX HEENT      laser eye surgery left eye, prosthetic right eye    HX HIP REPLACEMENT Bilateral     HX ORTHOPAEDIC      right hip replacement, left hip replacement    HX OTHER SURGICAL      Lumbar Compression    HX REFRACTIVE SURGERY             * Patient was identified by name and date of birth   * Agreement on procedure being performed was verified  * Risks and Benefits explained to the patient  * Procedure site verified and marked as necessary  * Patient was positioned for comfort  * Consent was signed and verified  11:03 AM    The patient was instructed on post injection care. We did see Mr. Damian Vaz for followup with regards to his hips, as well as complaints of left knee pain. The patient is status post revision hip replacement. He has done quite well with it. He does have some back problems being followed by Dr. Lucrecia Campoverde. His back is quiescent at this point. Over the past couple of weeks, he has developed some knee, worse getting up from a chair, going up and down stairs, and trying to kneel on his knee. He has no night pain. He denies any current locking or giving way. He has had no injury to report. PHYSICAL EXAMINATION:  In general, the patient is alert and oriented x 3 in no acute distress.   The patient is well-developed, well-nourished, with a normal affect. The patient is afebrile. HEENT:  Head is normocephalic and atraumatic. Pupils are equally round and reactive to light and accommodation. Extraocular eye movements are intact. Neck is supple. Trachea is midline. No JVD is present. Breathing is nonlabored. Examination of the lower extremities reveals pain-free range of motion of the hips. There is no pain to palpation of the greater trochanteric bursae. There is negative straight leg raise. There is negative calf tenderness. There is negative Isabellas. There is no evidence of DVT present. Examination of the left knee reveals the skin is intact. There is no erythema, no ecchymosis, no warmth, and no signs of infection or cellulitis present. He does have crepitus anteriorly with range of motion activities. He does have discomfort with patellofemoral grind. There is no pain medially and no pain laterally. There is a negative Lily's. He is ligamentously stable. Range of motion is well-preserved. RADIOGRAPHS:  Radiographs in the office today, including AP, tunnel, lateral, and skyline, shows moderate patellofemoral arthritis. No acute abnormalities are noted. The medial and lateral compartments are well-preserved. ASSESSMENT:  Left knee patellofemoral arthritis. PLAN:  At this point, we discussed treatment options. We are going to move forward with a cortisone injection for the left knee today. Under aseptic conditions, after informed and written consent, and appropriate time out performed, with ultrasound-guided assistance, the left knee was prepped with Betadine and 6 mg of Celestone was injected without complications. The patient tolerated the injection well. The patient was instructed on post injection care. We are going to plan on seeing him back in the office in about four weeks time for evaluation and to  the efficacy of the injection.   We may consider an MRI of the knee if the injection does not do much for him.                         JR Owens MPAS, PA-C, ATC

## 2018-05-11 ENCOUNTER — OFFICE VISIT (OUTPATIENT)
Dept: ORTHOPEDIC SURGERY | Age: 63
End: 2018-05-11

## 2018-05-11 VITALS
WEIGHT: 167 LBS | HEIGHT: 69 IN | BODY MASS INDEX: 24.73 KG/M2 | SYSTOLIC BLOOD PRESSURE: 150 MMHG | TEMPERATURE: 98 F | HEART RATE: 76 BPM | OXYGEN SATURATION: 97 % | DIASTOLIC BLOOD PRESSURE: 81 MMHG | RESPIRATION RATE: 16 BRPM

## 2018-05-11 DIAGNOSIS — M17.12 PRIMARY OSTEOARTHRITIS OF LEFT KNEE: Primary | ICD-10-CM

## 2018-05-11 DIAGNOSIS — M70.62 TROCHANTERIC BURSITIS OF LEFT HIP: ICD-10-CM

## 2018-05-11 DIAGNOSIS — Z96.642 S/P HIP REPLACEMENT, LEFT: ICD-10-CM

## 2018-05-11 RX ORDER — HYDROCODONE BITARTRATE AND ACETAMINOPHEN 5; 325 MG/1; MG/1
1 TABLET ORAL
Qty: 28 TAB | Refills: 0 | Status: SHIPPED | OUTPATIENT
Start: 2018-05-11 | End: 2018-07-19 | Stop reason: SDUPTHER

## 2018-05-11 NOTE — PROGRESS NOTES
[address] [place] [address]  [phone]           Patient: Carla Oliva. MRN: [id number]       SSN: [id number]  YOB: 1955        AGE: 65 y.o. SEX: male  Body mass index is 24.66 kg/(m^2). PCP: Terry Toledo MD  05/11/18      This office note has been dictated. REVIEW OF SYSTEMS:  Constitutional: Negative for fever, chills, weight loss and malaise/fatigue. HENT: Negative. Eyes: Negative. Respiratory: Negative. Cardiovascular: Negative. Gastrointestinal: No bowel incontinence or constipation. Genitourinary: No bladder incontinence or saddle anesthesia. Skin: Negative. Neurological: Negative. Endo/Heme/Allergies: Negative. Psychiatric/Behavioral: Negative. Musculoskeletal: As per HPI above. Past Medical History:   Diagnosis Date    Arthritis     Balance problems     High cholesterol     Low back pain     Migraine     Right hip pain     Sleep apnea     does not use cpap         Current Outpatient Prescriptions:     HYDROcodone-acetaminophen (NORCO) 5-325 mg per tablet, Take 1 Tab by mouth every six (6) hours as needed for Pain. Max Daily Amount: 4 Tabs., Disp: 28 Tab, Rfl: 0    ibuprofen (MOTRIN) 200 mg tablet, Take 400 mg by mouth every six (6) hours as needed for Pain., Disp: , Rfl:     atorvastatin (LIPITOR) 40 mg tablet, Take 1 Tab by mouth., Disp: , Rfl:     aspirin (ASPIRIN) 325 mg tablet, Take 1 Tab by mouth two (2) times a day., Disp: 60 Tab, Rfl: 0    No Known Allergies    Social History     Social History    Marital status:      Spouse name: N/A    Number of children: N/A    Years of education: N/A     Occupational History    Not on file.      Social History Main Topics    Smoking status: Never Smoker    Smokeless tobacco: Never Used    Alcohol use 3.6 oz/week     6 Cans of beer per week      Comment: week    Drug use: Yes     Special: Marijuana      Comment: last 7/19/2015    Sexual activity: Not on file     Other Topics Concern    Not on file     Social History Narrative       Past Surgical History:   Procedure Laterality Date    HX HEENT      laser eye surgery left eye, prosthetic right eye    HX HIP REPLACEMENT Bilateral     HX ORTHOPAEDIC      right hip replacement, left hip replacement    HX OTHER SURGICAL      Lumbar Compression    HX REFRACTIVE SURGERY           We did see Mr. Carlitos Nails for followup with regards to his left lower extremity. The patient does have back troubles. He is being followed by Dr. Elieser Bang. He has had revision of his left hip, poly exchange. He did very well with it. He has had a little bursitis in the past.  It is currently not bothering him. Recently, he has had some knee troubles. He has trouble getting up from a chair and going up and down stairs. We gave him an injection for his knee upon his last visit, which helped him considerably. He is having no mechanical symptoms and no locking or giving way. The knee pain is improved. He takes occasional pain medicine. He has had no recent fevers, chills, systemic changes, or injuries to report. PHYSICAL EXAMINATION:  In general, the patient is alert and oriented x 3 in no acute distress. The patient is well-developed, well-nourished, with a normal affect. The patient is afebrile. HEENT:  Head is normocephalic and atraumatic. Pupils are equally round and reactive to light and accommodation. Extraocular eye movements are intact. Neck is supple. Trachea is midline. No JVD is present. Breathing is nonlabored. Examination of the lower extremities reveals pain-free range of motion of each of the hips. There is no pain to palpation of the greater trochanteric bursae. There is negative straight leg raise. There is negative calf tenderness. There is negative Isabellas. There is no evidence of DVT present.  Examination of the left knee reveals the skin is intact. There is no ecchymosis and no warmth. There are no signs for infection or cellulitis present. He does have a little bit of crepitus anteriorly with range of motion activities with minimal discomfort to palpation about the knee. He has negative patellofemoral grind. ASSESSMENT:      1. Status post revision left hip replacement. 2. Trochanteric bursitis left hip. 3. Left knee osteoarthritis. PLAN:  At this point, the patient is currently doing pretty well. He will continue with Dr. Elieser Bang with regards to his back. We will plan on seeing him back in the office in two months time for evaluation. We will consider repeat injection for the left knee at that point if necessary. He was given a refill of his pain medicine, one po q six hours, #28. One-week supply. He will call with any questions or concerns that shall arise.                     JR Roman RAMIREZ, PATyreeC, ATC

## 2018-07-19 ENCOUNTER — OFFICE VISIT (OUTPATIENT)
Dept: ORTHOPEDIC SURGERY | Age: 63
End: 2018-07-19

## 2018-07-19 VITALS
HEART RATE: 68 BPM | OXYGEN SATURATION: 98 % | HEIGHT: 69 IN | BODY MASS INDEX: 23.25 KG/M2 | WEIGHT: 157 LBS | SYSTOLIC BLOOD PRESSURE: 115 MMHG | TEMPERATURE: 98.6 F | DIASTOLIC BLOOD PRESSURE: 70 MMHG

## 2018-07-19 DIAGNOSIS — M70.62 TROCHANTERIC BURSITIS OF LEFT HIP: ICD-10-CM

## 2018-07-19 DIAGNOSIS — M17.12 PRIMARY OSTEOARTHRITIS OF LEFT KNEE: Primary | ICD-10-CM

## 2018-07-19 DIAGNOSIS — Z96.642 S/P HIP REPLACEMENT, LEFT: ICD-10-CM

## 2018-07-19 RX ORDER — MELOXICAM 15 MG/1
15 TABLET ORAL DAILY
Qty: 30 TAB | Refills: 2 | Status: SHIPPED | OUTPATIENT
Start: 2018-07-19 | End: 2018-10-16 | Stop reason: SDUPTHER

## 2018-07-19 RX ORDER — HYDROCODONE BITARTRATE AND ACETAMINOPHEN 5; 325 MG/1; MG/1
1 TABLET ORAL
Qty: 28 TAB | Refills: 0 | Status: SHIPPED | OUTPATIENT
Start: 2018-07-19 | End: 2018-10-19 | Stop reason: SDUPTHER

## 2018-07-19 RX ORDER — BETAMETHASONE SODIUM PHOSPHATE AND BETAMETHASONE ACETATE 3; 3 MG/ML; MG/ML
6 INJECTION, SUSPENSION INTRA-ARTICULAR; INTRALESIONAL; INTRAMUSCULAR; SOFT TISSUE ONCE
Qty: 1 ML | Refills: 0
Start: 2018-07-19 | End: 2018-07-19

## 2018-07-19 NOTE — PROGRESS NOTES
[address] [place] [address]  [phone]           Patient: Arnaldo Conner. MRN: [id number]       SSN: [id number]  YOB: 1955        AGE: 65 y.o. SEX: male  Body mass index is 23.18 kg/(m^2). PCP: Valencia Reeves MD  07/19/18      This office note has been dictated. REVIEW OF SYSTEMS:  Constitutional: Negative for fever, chills, weight loss and malaise/fatigue. HENT: Negative. Eyes: Negative. Respiratory: Negative. Cardiovascular: Negative. Gastrointestinal: No bowel incontinence or constipation. Genitourinary: No bladder incontinence or saddle anesthesia. Skin: Negative. Neurological: Negative. Endo/Heme/Allergies: Negative. Psychiatric/Behavioral: Negative. Musculoskeletal: As per HPI above. Past Medical History:   Diagnosis Date    Arthritis     Balance problems     High cholesterol     Low back pain     Migraine     Right hip pain     Sleep apnea     does not use cpap         Current Outpatient Prescriptions:     betamethasone (CELESTONE SOLUSPAN) 6 mg/mL injection, 1 mL by Intra artICUlar route once for 1 dose., Disp: 1 mL, Rfl: 0    HYDROcodone-acetaminophen (NORCO) 5-325 mg per tablet, Take 1 Tab by mouth every six (6) hours as needed for Pain. Max Daily Amount: 4 Tabs., Disp: 28 Tab, Rfl: 0    meloxicam (MOBIC) 15 mg tablet, Take 1 Tab by mouth daily. , Disp: 30 Tab, Rfl: 2    ibuprofen (MOTRIN) 200 mg tablet, Take 400 mg by mouth every six (6) hours as needed for Pain., Disp: , Rfl:     atorvastatin (LIPITOR) 40 mg tablet, Take 1 Tab by mouth., Disp: , Rfl:     aspirin (ASPIRIN) 325 mg tablet, Take 1 Tab by mouth two (2) times a day., Disp: 60 Tab, Rfl: 0    No Known Allergies    Social History     Social History    Marital status:      Spouse name: N/A    Number of children: N/A    Years of education: N/A     Occupational History    Not on file.     Social History Main Topics    Smoking status: Never Smoker    Smokeless tobacco: Never Used    Alcohol use 3.6 oz/week     6 Cans of beer per week      Comment: week    Drug use: Yes     Special: Marijuana      Comment: last 7/19/2015    Sexual activity: Not on file     Other Topics Concern    Not on file     Social History Narrative       Past Surgical History:   Procedure Laterality Date    HX HEENT      laser eye surgery left eye, prosthetic right eye    HX HIP REPLACEMENT Bilateral     HX ORTHOPAEDIC      right hip replacement, left hip replacement    HX OTHER SURGICAL      Lumbar Compression    HX REFRACTIVE SURGERY             * Patient was identified by name and date of birth   * Agreement on procedure being performed was verified  * Risks and Benefits explained to the patient  * Procedure site verified and marked as necessary  * Patient was positioned for comfort  * Consent was signed and verified  3:29 PM    The patient was instructed on post injection care. We did see Mr. Yuan Grace for followup with regards to his left knee. He has also had a revision of his left hip. He did well with it. With regards to the knee, he does have known patellofemoral arthritis. He had an injection in the past, which gave him good relief. It is beginning to bother him again. He has trouble getting up from a chair and going up and down stairs. He has had no catching or giving way. He denies any radiating pain or numbness down the lower extremities. However, he does have a history of back problems followed by Dr. Saenz. He is requesting a refill of his Norco.  He is currently taking no anti-inflammatories. PHYSICAL EXAMINATION:  In general, the patient is alert and oriented x 3 in no acute distress. The patient is well-developed, well-nourished, with a normal affect. The patient is afebrile. HEENT:  Head is normocephalic and atraumatic.   Pupils are equally round and reactive to light and accommodation. Extraocular eye movements are intact. Neck is supple. Trachea is midline. No JVD is present. Breathing is nonlabored. Examination of the lower extremities reveals pain-free range of motion of the hips. There is no pain to palpation of the greater trochanteric bursae. There is negative straight leg raise. There is negative calf tenderness. There is negative Isabellas. There is no evidence of DVT present. The left knee reveals the skin is intact. There is no ecchymosis and no warmth. He does have some crepitus anteriorly with range of motion activities and discomfort with patellofemoral grind. There may be a slight plica band with some clicking noted. ASSESSMENT:      1. Left knee patellofemoral arthritis. 2. Status post revision left hip replacement. 3. Trochanteric bursitis left hip, quiescent. PLAN:  At this point, we discussed treatment options. We are going to move forward with a repeat cortisone injection for the left knee today. Under aseptic conditions, after informed and written consent, and appropriate time out performed, with ultrasound-guided assistance, the left knee was prepped with Betadine and 6 mg of Celestone was injected without complications. The patient tolerated the injection well. The patient was instructed on post injection care. We are going to start him on Mobic 15 mg a day with food. I instructed him on use, as well as precautions. He was given a one-week supply of Norco today in the office. We will see him back in three months time for evaluation. He will call with any questions or concerns that shall arise.                        JR Roman RAMIREZ, ROX, ATC

## 2018-10-16 DIAGNOSIS — M70.62 TROCHANTERIC BURSITIS OF LEFT HIP: ICD-10-CM

## 2018-10-16 DIAGNOSIS — M17.12 PRIMARY OSTEOARTHRITIS OF LEFT KNEE: ICD-10-CM

## 2018-10-16 DIAGNOSIS — Z96.642 S/P HIP REPLACEMENT, LEFT: ICD-10-CM

## 2018-10-16 NOTE — TELEPHONE ENCOUNTER
Last Visit: 07/19/2018 with RENEA Deluna    Next Appointment: 10/19/2018 with RENEA Deluna   Previous Refill Encounters: 07/19/2018 per RENEA Deluna #30 with 2 refills    Requested Prescriptions     Pending Prescriptions Disp Refills    meloxicam (MOBIC) 15 mg tablet 90 Tab 0     Sig: Take 1 Tab by mouth daily.

## 2018-10-17 RX ORDER — MELOXICAM 15 MG/1
15 TABLET ORAL DAILY
Qty: 90 TAB | Refills: 0 | Status: SHIPPED | OUTPATIENT
Start: 2018-10-17

## 2018-10-19 ENCOUNTER — OFFICE VISIT (OUTPATIENT)
Dept: ORTHOPEDIC SURGERY | Age: 63
End: 2018-10-19

## 2018-10-19 VITALS
HEIGHT: 69 IN | OXYGEN SATURATION: 100 % | BODY MASS INDEX: 23.55 KG/M2 | SYSTOLIC BLOOD PRESSURE: 135 MMHG | RESPIRATION RATE: 16 BRPM | HEART RATE: 61 BPM | WEIGHT: 159 LBS | TEMPERATURE: 98 F | DIASTOLIC BLOOD PRESSURE: 75 MMHG

## 2018-10-19 DIAGNOSIS — Z96.642 S/P HIP REPLACEMENT, LEFT: ICD-10-CM

## 2018-10-19 DIAGNOSIS — M70.62 TROCHANTERIC BURSITIS OF LEFT HIP: ICD-10-CM

## 2018-10-19 DIAGNOSIS — Z96.641 HISTORY OF TOTAL RIGHT HIP REPLACEMENT: Primary | ICD-10-CM

## 2018-10-19 RX ORDER — HYDROCODONE BITARTRATE AND ACETAMINOPHEN 5; 325 MG/1; MG/1
1 TABLET ORAL
Qty: 28 TAB | Refills: 0 | Status: SHIPPED | OUTPATIENT
Start: 2018-10-19

## 2018-10-19 NOTE — PROGRESS NOTES
[address] [place] [address]  [phone]           Patient: Angie Gasca. MRN: [id number]       SSN: [id number]  YOB: 1955        AGE: 65 y.o. SEX: male  Body mass index is 23.48 kg/m². PCP: Lu Goldmann, MD  10/19/18      This office note has been dictated. REVIEW OF SYSTEMS:  Constitutional: Negative for fever, chills, weight loss and malaise/fatigue. HENT: Negative. Eyes: Negative. Respiratory: Negative. Cardiovascular: Negative. Gastrointestinal: No bowel incontinence or constipation. Genitourinary: No bladder incontinence or saddle anesthesia. Skin: Negative. Neurological: Negative. Endo/Heme/Allergies: Negative. Psychiatric/Behavioral: Negative. Musculoskeletal: As per HPI above. Past Medical History:   Diagnosis Date    Arthritis     Balance problems     High cholesterol     Low back pain     Migraine     Right hip pain     Sleep apnea     does not use cpap         Current Outpatient Medications:     HYDROcodone-acetaminophen (NORCO) 5-325 mg per tablet, Take 1 Tab by mouth every six (6) hours as needed for Pain. Max Daily Amount: 4 Tabs., Disp: 28 Tab, Rfl: 0    meloxicam (MOBIC) 15 mg tablet, Take 1 Tab by mouth daily. , Disp: 90 Tab, Rfl: 0    ibuprofen (MOTRIN) 200 mg tablet, Take 400 mg by mouth every six (6) hours as needed for Pain., Disp: , Rfl:     atorvastatin (LIPITOR) 40 mg tablet, Take 1 Tab by mouth., Disp: , Rfl:     aspirin (ASPIRIN) 325 mg tablet, Take 1 Tab by mouth two (2) times a day., Disp: 60 Tab, Rfl: 0    No Known Allergies    Social History     Socioeconomic History    Marital status:      Spouse name: Not on file    Number of children: Not on file    Years of education: Not on file    Highest education level: Not on file   Social Needs    Financial resource strain: Not on file    Food insecurity - worry: Not on file    Food insecurity - inability: Not on file    Transportation needs - medical: Not on file   [employer] needs - non-medical: Not on file   Occupational History    Not on file   Tobacco Use    Smoking status: Never Smoker    Smokeless tobacco: Never Used   Substance and Sexual Activity    Alcohol use: Yes     Alcohol/week: 3.6 oz     Types: 6 Cans of beer per week     Comment: week    Drug use: Yes     Types: Marijuana     Comment: last 7/19/2015    Sexual activity: Not on file   Other Topics Concern    Not on file   Social History Narrative    Not on file       Past Surgical History:   Procedure Laterality Date    HX HEENT      laser eye surgery left eye, prosthetic right eye    HX HIP REPLACEMENT Bilateral     HX ORTHOPAEDIC      right hip replacement, left hip replacement    HX OTHER SURGICAL      Lumbar Compression    HX REFRACTIVE SURGERY             We did see Mr. Ravindra Cochran for followup with regards to his hips and his knee. The patient is status post bilateral hip replacements. She did have a revision on the left with poly exchange. She did very well with it. He has been receiving injections every three months or so for his left knee, which continue to work well for him. Currently, he is doing extremely well. He is having really no pain. He is followed by Dr. Michael Anderson with regards to his back. He was originally started on Gabapentin, which has helped him considerably. He has had no recent fevers, chills, systemic changes, or injuries to report. PHYSICAL EXAMINATION:  In general, the patient is alert and oriented x 3 in no acute distress. The patient is well-developed, well-nourished, with a normal affect. The patient is afebrile. HEENT:  Head is normocephalic and atraumatic. Pupils are equally round and reactive to light and accommodation. Extraocular eye movements are intact. Neck is supple. Trachea is midline. No JVD is present. Breathing is nonlabored.   Examination of the lower extremities reveals pain-free range of motion of the hips. There is no pain to palpation of the greater trochanteric bursae. Abduction strength is symmetric bilaterally. Leg lengths look good. Neurovascular status is intact to the lower extremity. There is no calf tenderness. There is a negative Isabella's sign. There are no signs for DVT present. The left knee reveals the skin is intact. There is no ecchymosis, no warmth, and no signs of infection or cellulitis present. She does have a little crepitus anteriorly with range of motion activities. There is no discomfort to palpation about the knee today. ASSESSMENT:      1. Status post bilateral hip replacements, the left with revision and poly exchange doing well. 2. Left knee osteoarthritis, quiescent. PLAN:  At this point, the patient is doing quite well. He will continue with Dr. Bola Dickey with regards to his back. He was given a one-week supply of Norco for his intermittent discomfort. We will see him back in the office in about three months time for evaluation and repeat injection for the left knee if necessary.                   JR Roman RAMIREZ, PATyreeC, ATC

## 2019-05-24 ENCOUNTER — OFFICE VISIT (OUTPATIENT)
Dept: ORTHOPEDIC SURGERY | Age: 64
End: 2019-05-24

## 2019-05-24 VITALS
WEIGHT: 166 LBS | TEMPERATURE: 98.6 F | HEIGHT: 69 IN | BODY MASS INDEX: 24.59 KG/M2 | OXYGEN SATURATION: 99 % | HEART RATE: 58 BPM | DIASTOLIC BLOOD PRESSURE: 79 MMHG | SYSTOLIC BLOOD PRESSURE: 142 MMHG | RESPIRATION RATE: 16 BRPM

## 2019-05-24 DIAGNOSIS — M70.62 TROCHANTERIC BURSITIS OF LEFT HIP: ICD-10-CM

## 2019-05-24 DIAGNOSIS — Z96.642 S/P HIP REPLACEMENT, LEFT: Primary | ICD-10-CM

## 2019-05-24 DIAGNOSIS — M17.12 PRIMARY OSTEOARTHRITIS OF LEFT KNEE: ICD-10-CM

## 2019-05-24 RX ORDER — HYDROCODONE BITARTRATE AND ACETAMINOPHEN 7.5; 325 MG/1; MG/1
1 TABLET ORAL
Qty: 28 TAB | Refills: 0 | Status: SHIPPED | OUTPATIENT
Start: 2019-05-24 | End: 2019-06-07

## 2019-05-24 RX ORDER — GABAPENTIN 300 MG/1
300 CAPSULE ORAL 3 TIMES DAILY
COMMUNITY

## 2019-05-24 RX ORDER — BETAMETHASONE SODIUM PHOSPHATE AND BETAMETHASONE ACETATE 3; 3 MG/ML; MG/ML
6 INJECTION, SUSPENSION INTRA-ARTICULAR; INTRALESIONAL; INTRAMUSCULAR; SOFT TISSUE ONCE
Qty: 1 ML | Refills: 0
Start: 2019-05-24 | End: 2019-05-24

## 2019-05-24 NOTE — PROGRESS NOTES
1. Have you been to the ER, urgent care clinic since your last visit? Hospitalized since your last visit? NO    2. Have you seen or consulted any other health care providers outside of the [address] [place] [address] since your last visit? Include any pap smears or colon screening.  NO

## 2019-05-24 NOTE — PROGRESS NOTES
[address] [place] [address]  [phone]           Patient: Domo Mendez. MRN: [id number]       SSN: [id number]  YOB: 1955        AGE: 59 y.o. SEX: male  Body mass index is 24.51 kg/m². PCP: Tariq Baer MD  05/24/19      This office note has been dictated. REVIEW OF SYSTEMS:  Constitutional: Negative for fever, chills, weight loss and malaise/fatigue. HENT: Negative. Eyes: Negative. Respiratory: Negative. Cardiovascular: Negative. Gastrointestinal: No bowel incontinence or constipation. Genitourinary: No bladder incontinence or saddle anesthesia. Skin: Negative. Neurological: Negative. Endo/Heme/Allergies: Negative. Psychiatric/Behavioral: Negative. Musculoskeletal: As per HPI above. Past Medical History:   Diagnosis Date    Arthritis     Balance problems     High cholesterol     Low back pain     Migraine     Right hip pain     Sleep apnea     does not use cpap         Current Outpatient Medications:     HYDROcodone-acetaminophen (NORCO) 5-325 mg per tablet, Take 1 Tab by mouth every six (6) hours as needed for Pain. Max Daily Amount: 4 Tabs., Disp: 28 Tab, Rfl: 0    meloxicam (MOBIC) 15 mg tablet, Take 1 Tab by mouth daily. , Disp: 90 Tab, Rfl: 0    ibuprofen (MOTRIN) 200 mg tablet, Take 400 mg by mouth every six (6) hours as needed for Pain., Disp: , Rfl:     atorvastatin (LIPITOR) 40 mg tablet, Take 1 Tab by mouth., Disp: , Rfl:     aspirin (ASPIRIN) 325 mg tablet, Take 1 Tab by mouth two (2) times a day., Disp: 60 Tab, Rfl: 0    gabapentin (NEURONTIN) 300 mg capsule, Take 300 mg by mouth three (3) times daily. , Disp: , Rfl:     No Known Allergies    Social History     Socioeconomic History    Marital status:      Spouse name: Not on file    Number of children: Not on file    Years of education: Not on file    Highest education level: Not on file   Occupational History    Not on file   Social Needs    Financial resource strain: Not on file    Food insecurity:     Worry: Not on file     Inability: Not on file    Transportation needs:     Medical: Not on file     Non-medical: Not on file   Tobacco Use    Smoking status: Never Smoker    Smokeless tobacco: Never Used   Substance and Sexual Activity    Alcohol use: Yes     Alcohol/week: 3.6 oz     Types: 6 Cans of beer per week     Comment: week    Drug use: Yes     Types: Marijuana     Comment: last 7/19/2015    Sexual activity: Not on file   Lifestyle    Physical activity:     Days per week: Not on file     Minutes per session: Not on file    Stress: Not on file   Relationships    Social connections:     Talks on phone: Not on file     Gets together: Not on file     Attends L[employer] day service: Not on file     Active member of club or organization: Not on file     Attends meetings of clubs or organizations: Not on file     Relationship status: Not on file    Intimate partner violence:     Fear of current or ex partner: Not on file     Emotionally abused: Not on file     Physically abused: Not on file     Forced sexual activity: Not on file   Other Topics Concern    Not on file   Social History Narrative    Not on file       Past Surgical History:   Procedure Laterality Date    HX HEENT      laser eye surgery left eye, prosthetic right eye    HX HIP REPLACEMENT Bilateral     HX ORTHOPAEDIC      right hip replacement, left hip replacement    HX OTHER SURGICAL      Lumbar Compression    HX REFRACTIVE SURGERY             * Patient was identified by name and date of birth   * Agreement on procedure being performed was verified  * Risks and Benefits explained to the patient  * Procedure site verified and marked as necessary  * Patient was positioned for comfort  * Consent was signed and verified  4:07 PM    The patient was instructed on post injection care. We did see Mr. Andres Ley for followup with regards to his bilateral hips and left knee. He is status post right hip replacement and has done very well. He is having no pain in the right hip. He has had a left hip replacement revision. He does have a little trochanteric bursitis. He has had injections in the past, which worked well for him. He presents today for reevaluation and requests repeat injection. His left knee does have known arthritis, patellofemoral.  It has been quiescent and not causing much trouble. He has had no mechanical symptomatology and no recent fever, chills, systemic changes, or injuries to report, and no chest pain or shortness of breath. PHYSICAL EXAMINATION:  In general, the patient is alert and oriented x 3 in no acute distress. The patient is well-developed, well-nourished, with a normal affect. The patient is afebrile. HEENT:  Head is normocephalic and atraumatic. Pupils are equally round and reactive to light and accommodation. Extraocular eye movements are intact. Neck is supple. Trachea is midline. No JVD is present. Breathing is nonlabored. Examination of the lower extremities reveals pain-free range of motion of the hips. He does have a little discomfort to palpation of the greater trochanteric bursa on the left side and a little bit to the IT band traveling distally. There is negative straight leg raise. There is negative calf tenderness. There is negative Isabella's. There is no evidence of DVT present. The left knee reveals the skin is intact. There is no ecchymosis, no warmth. He has full range of motion and good stability. Some mild crepitus anteriorly with range of motion activities noted. ASSESSMENT:      1. Status post revision left hip replacement. 2. Trochanteric bursitis left hip. 3. Right hip replacement. 4. Left knee mild to moderate patellofemoral arthritis.     PLAN:  At this point, we are going to move forward with a repeat cortisone injection for the left hip today. Under aseptic conditions, and after informed and written consent, with ultrasound-guided assistance, the left hip was prepped with Betadine and 6 mg of Celestone was injected without complications. The patient tolerated the injection well. The patient was instructed on post injection care. We will see him back in the office in about three months' time for evaluation and repeat injection.   He was given a one-week supply of Nevaeh RAMIREZ PA-C, ATC

## 2019-06-07 ENCOUNTER — TELEPHONE (OUTPATIENT)
Dept: ORTHOPEDIC SURGERY | Age: 64
End: 2019-06-07

## 2019-06-07 NOTE — TELEPHONE ENCOUNTER
Patient is requesting a refill on his gabapentin (NEURONTIN) 300 mg. Please advise and pend new Rx if appropriate. Last visit:  1/23/18 with MD Broderick Tatum  Next appt:  Return in 4 weeks - pt.  Cancelled appt on 2/20/19 and was a no show on 2/27/19  Previous refill encounter:  1/23/18 #90 with 1 refills

## 2019-08-20 ENCOUNTER — OFFICE VISIT (OUTPATIENT)
Dept: ORTHOPEDIC SURGERY | Age: 64
End: 2019-08-20

## 2019-08-20 VITALS
OXYGEN SATURATION: 100 % | SYSTOLIC BLOOD PRESSURE: 150 MMHG | DIASTOLIC BLOOD PRESSURE: 69 MMHG | BODY MASS INDEX: 24.35 KG/M2 | RESPIRATION RATE: 16 BRPM | HEIGHT: 69 IN | TEMPERATURE: 98.2 F | HEART RATE: 50 BPM | WEIGHT: 164.4 LBS

## 2019-08-20 DIAGNOSIS — M47.817 LUMBOSACRAL SPONDYLOSIS WITHOUT MYELOPATHY: Primary | ICD-10-CM

## 2019-08-20 DIAGNOSIS — M51.36 OTHER INTERVERTEBRAL DISC DEGENERATION, LUMBAR REGION: ICD-10-CM

## 2019-08-20 RX ORDER — GABAPENTIN 300 MG/1
300 CAPSULE ORAL 3 TIMES DAILY
Qty: 90 CAP | Refills: 1 | Status: SHIPPED | OUTPATIENT
Start: 2019-08-20 | End: 2019-09-17 | Stop reason: SDUPTHER

## 2019-08-20 NOTE — LETTER
8/20/19 Patient: Duarte Salinas. YOB: 1955 Date of Visit: 8/20/2019 Janet Hamilton MD 
[id number] [place] [address] VIA Facsimile: [phone] Dear Janet Hamilton MD, Thank you for referring Mr. Frank Rodriguez to [address] [place] [address] for evaluation. My notes for this consultation are attached. If you have questions, please do not hesitate to call me. I look forward to following your patient along with you. Sincerely, Vida Fink MD

## 2019-08-20 NOTE — PROGRESS NOTES
[facility]  [address], [place]   Phone: [phone]  Fax: [phone]        PROGRESS NOTE      HISTORY OF PRESENT ILLNESS:  The patient is a 59 y.o. male and was seen today for follow up of left hip pain extending circumferentially into the LLE to the knee since January 2018. Initially had c/o low back pain extending in roughly an L5 distribution into the bilateral lower extremities. He endorses low back pain first thing in the morning. The patient states he is doing well. He is followed by Kdear Lowry. [address] [facility] for bilateral total hip replacement. Note from Dr. Basilio Bundy dated 3/14/16 indicates patient needs a new left hip replacement. Patient states he plans to have left total hip replacement revision done after July. Note from Beth Leos dated 10/20/2016 reveals pt had follow up revision of left total hip replacement on 8/3/2016. Pt reports that since the hip replacement his pain has improved. Patient is prescribed Norco by Dr. Basilio Bundy. Pt states he stopped his Neurontin after his hip surgery and is doing well off the medication. He has completed PT. Note from Kerri Tinoco dated 1/15/18 reviewed. His note indicated patient was seen with c/o left hip pain. At that time he underwent a left trochanteric injection without benefit. Patient reports he will f/u with RENEA Leos in 3 months. MRI dated 12/18/12. Per report revealed degenerative changes in the lumbar spine remaining relatively stable, most pronounced at L4-L5 where there may be mass effect upon the crossing L5 nerve roots bilaterally and moderate left-sided foraminal stenosis. At his last clinic appointment, I tried him on Neurontin 300 mg TID. I encouraged patient to perform his HEP daily. The patient returns today with pain location and distribution remain unchanged. He rates his pain 2-4/10, previously 7/10. Pt was tolerating Neurontin 300 mg TID with relief.  Last seen by me 1/23/18, scheduled f/u for 1 month, failed to do so due to transportation issues. Pt denies change in bowel or bladder habits. Note from Jolly Burks NP dated 5/24/19 indicating patient was seen status post right hip replacement. He has had a left hip replacement revision. He does have a little trochanteric bursitis. Performed cortisone injection for left hip that day. Treated with [place].     reviewed. Body mass index is 24.28 kg/m². PCP: Janna Franklin MD      Past Medical History:   Diagnosis Date    Arthritis     Balance problems     High cholesterol     Low back pain     Migraine     Right hip pain     Sleep apnea     does not use cpap        Social History     Socioeconomic History    Marital status:      Spouse name: Not on file    Number of children: Not on file    Years of education: Not on file    Highest education level: Not on file   Occupational History    Not on file   Social Needs    Financial resource strain: Not on file    Food insecurity:     Worry: Not on file     Inability: Not on file    Transportation needs:     Medical: Not on file     Non-medical: Not on file   Tobacco Use    Smoking status: Never Smoker    Smokeless tobacco: Never Used   Substance and Sexual Activity    Alcohol use:  Yes     Alcohol/week: 6.0 standard drinks     Types: 6 Cans of beer per week     Comment: week    Drug use: Yes     Types: Marijuana     Comment: last 7/19/2015    Sexual activity: Not on file   Lifestyle    Physical activity:     Days per week: Not on file     Minutes per session: Not on file    Stress: Not on file   Relationships    Social connections:     Talks on phone: Not on file     Gets together: Not on file     Attends Adventism service: Not on file     Active member of club or organization: Not on file     Attends meetings of clubs or organizations: Not on file     Relationship status: Not on file    Intimate partner violence:     Fear of current or ex partner: Not on file     Emotionally abused: Not on file     Physically abused: Not on file     Forced sexual activity: Not on file   Other Topics Concern    Not on file   Social History Narrative    Not on file       Current Outpatient Medications   Medication Sig Dispense Refill    gabapentin (NEURONTIN) 300 mg capsule Take 300 mg by mouth three (3) times daily.  HYDROcodone-acetaminophen (NORCO) 5-325 mg per tablet Take 1 Tab by mouth every six (6) hours as needed for Pain. Max Daily Amount: 4 Tabs. 28 Tab 0    meloxicam (MOBIC) 15 mg tablet Take 1 Tab by mouth daily. 90 Tab 0    ibuprofen (MOTRIN) 200 mg tablet Take 400 mg by mouth every six (6) hours as needed for Pain.  atorvastatin (LIPITOR) 40 mg tablet Take 1 Tab by mouth.  aspirin (ASPIRIN) 325 mg tablet Take 1 Tab by mouth two (2) times a day. 60 Tab 0       No Known Allergies       PHYSICAL EXAMINATION    Visit Vitals  /69 (BP 1 Location: Left arm, BP Patient Position: Sitting)   Pulse (!) 50   Temp 98.2 °F (36.8 °C) (Oral)   Resp 16   Ht 5' 9\" (1.753 m)   Wt 164 lb 6.4 oz (74.6 kg)   SpO2 100%   BMI 24.28 kg/m²       CONSTITUTIONAL: NAD, A&O x 3  SENSATION: Intact to light touch throughout  NEURO: Franki's is negative bilaterally. RANGE OF MOTION: The patient has full passive range of motion in all four extremities. MOTOR:  Straight Leg Raise: Negative, bilateral               Hip Flex Knee Ext Knee Flex Ankle DF GTE Ankle PF Tone   Right +4/5 +4/5 +4/5 +4/5 +4/5 +4/5 +4/5   Left +4/5 +4/5 +4/5 +4/5 +4/5 +4/5 +4/5       ASSESSMENT   Diagnoses and all orders for this visit:    1. Lumbosacral spondylosis without myelopathy    2. Other intervertebral disc degeneration, lumbar region          IMPRESSION AND PLAN:  I will restart him on Neurontin 300 mg TID. Patient advised to call the office if intolerant to medication. Patient is neurologically intact. I will see the patient back in 1 month's time or earlier if needed.       Written by Smith Constantino, Art Rowan, as dictated by Parag Yanez MD  I examined the patient, reviewed and agree with the note.

## 2019-08-30 ENCOUNTER — OFFICE VISIT (OUTPATIENT)
Dept: ORTHOPEDIC SURGERY | Age: 64
End: 2019-08-30

## 2019-08-30 VITALS
HEART RATE: 56 BPM | WEIGHT: 165.6 LBS | OXYGEN SATURATION: 100 % | TEMPERATURE: 97.2 F | DIASTOLIC BLOOD PRESSURE: 80 MMHG | HEIGHT: 69 IN | SYSTOLIC BLOOD PRESSURE: 139 MMHG | BODY MASS INDEX: 24.53 KG/M2

## 2019-08-30 DIAGNOSIS — Z96.642 HISTORY OF TOTAL LEFT HIP REPLACEMENT: ICD-10-CM

## 2019-08-30 DIAGNOSIS — M25.562 LEFT KNEE PAIN, UNSPECIFIED CHRONICITY: ICD-10-CM

## 2019-08-30 DIAGNOSIS — M70.62 TROCHANTERIC BURSITIS OF LEFT HIP: Primary | ICD-10-CM

## 2019-08-30 DIAGNOSIS — M17.12 PRIMARY OSTEOARTHRITIS OF LEFT KNEE: ICD-10-CM

## 2019-08-30 DIAGNOSIS — Z96.641 HISTORY OF TOTAL RIGHT HIP REPLACEMENT: ICD-10-CM

## 2019-08-30 RX ORDER — HYDROCODONE BITARTRATE AND ACETAMINOPHEN 7.5; 325 MG/1; MG/1
1 TABLET ORAL
Qty: 28 TAB | Refills: 0 | Status: SHIPPED | OUTPATIENT
Start: 2019-08-30 | End: 2019-09-06

## 2019-08-30 RX ORDER — BETAMETHASONE SODIUM PHOSPHATE AND BETAMETHASONE ACETATE 3; 3 MG/ML; MG/ML
6 INJECTION, SUSPENSION INTRA-ARTICULAR; INTRALESIONAL; INTRAMUSCULAR; SOFT TISSUE ONCE
Qty: 1 ML | Refills: 0
Start: 2019-08-30 | End: 2019-08-30

## 2019-08-30 NOTE — PROGRESS NOTES
[address] [place] [address]  [phone]           Patient: Rhianna Cortes MRN: [id number]       SSN: [id number]  YOB: 1955        AGE: 59 y.o. SEX: male  Body mass index is 24.45 kg/m². PCP: Erickson Huang MD  08/30/19      This office note has been dictated. REVIEW OF SYSTEMS:  Constitutional: Negative for fever, chills, weight loss and malaise/fatigue. HENT: Negative. Eyes: Negative. Respiratory: Negative. Cardiovascular: Negative. Gastrointestinal: No bowel incontinence or constipation. Genitourinary: No bladder incontinence or saddle anesthesia. Skin: Negative. Neurological: Negative. Endo/Heme/Allergies: Negative. Psychiatric/Behavioral: Negative. Musculoskeletal: As per HPI above. Past Medical History:   Diagnosis Date    Arthritis     Balance problems     High cholesterol     Low back pain     Migraine     Right hip pain     Sleep apnea     does not use cpap         Current Outpatient Medications:     betamethasone (CELESTONE SOLUSPAN) 6 mg/mL injection, 1 mL by Intra artICUlar route once for 1 dose., Disp: 1 mL, Rfl: 0    HYDROcodone-acetaminophen (NORCO) 7.5-325 mg per tablet, Take 1 Tab by mouth every six (6) hours as needed for Pain for up to 7 days. Max Daily Amount: 4 Tabs., Disp: 28 Tab, Rfl: 0    gabapentin (NEURONTIN) 300 mg capsule, Take 1 Cap by mouth three (3) times daily. Max Daily Amount: 900 mg., Disp: 90 Cap, Rfl: 1    meloxicam (MOBIC) 15 mg tablet, Take 1 Tab by mouth daily. , Disp: 90 Tab, Rfl: 0    ibuprofen (MOTRIN) 200 mg tablet, Take 400 mg by mouth every six (6) hours as needed for Pain., Disp: , Rfl:     atorvastatin (LIPITOR) 40 mg tablet, Take 1 Tab by mouth., Disp: , Rfl:     aspirin (ASPIRIN) 325 mg tablet, Take 1 Tab by mouth two (2) times a day., Disp: 60 Tab, Rfl: 0    gabapentin (NEURONTIN) 300 mg capsule, Take 300 mg by mouth three (3) times daily. , Disp: , Rfl:     HYDROcodone-acetaminophen (NORCO) 5-325 mg per tablet, Take 1 Tab by mouth every six (6) hours as needed for Pain. Max Daily Amount: 4 Tabs., Disp: 28 Tab, Rfl: 0    No Known Allergies    Social History     Socioeconomic History    Marital status:      Spouse name: Not on file    Number of children: Not on file    Years of education: Not on file    Highest education level: Not on file   Occupational History    Not on file   Social Needs    Financial resource strain: Not on file    Food insecurity:     Worry: Not on file     Inability: Not on file    Transportation needs:     Medical: Not on file     Non-medical: Not on file   Tobacco Use    Smoking status: Never Smoker    Smokeless tobacco: Never Used   Substance and Sexual Activity    Alcohol use:  Yes     Alcohol/week: 6.0 standard drinks     Types: 6 Cans of beer per week     Comment: week    Drug use: Yes     Types: Marijuana     Comment: last 7/19/2015    Sexual activity: Not on file   Lifestyle    Physical activity:     Days per week: Not on file     Minutes per session: Not on file    Stress: Not on file   Relationships    Social connections:     Talks on phone: Not on file     Gets together: Not on file     Attends Caodaism service: Not on file     Active member of club or organization: Not on file     Attends meetings of clubs or organizations: Not on file     Relationship status: Not on file    Intimate partner violence:     Fear of current or ex partner: Not on file     Emotionally abused: Not on file     Physically abused: Not on file     Forced sexual activity: Not on file   Other Topics Concern    Not on file   Social History Narrative    Not on file       Past Surgical History:   Procedure Laterality Date    HX HEENT      laser eye surgery left eye, prosthetic right eye    HX HIP REPLACEMENT Bilateral     HX ORTHOPAEDIC      right hip replacement, left hip replacement  HX OTHER SURGICAL      Lumbar Compression    HX REFRACTIVE SURGERY             * Patient was identified by name and date of birth   * Agreement on procedure being performed was verified  * Risks and Benefits explained to the patient  * Procedure site verified and marked as necessary  * Patient was positioned for comfort  * Consent was signed and verified  9:28 AM    The patient was instructed on post injection care. We did see Mr. Anaya Osorio for followup with regards to complaints of left laterally-based hip discomfort, as well as some left knee pain. The patient has had hip replacements bilaterally. He does have bursitis. He has had injections in the past, which give him good relief. He reports over the past couple of weeks, his laterally-based discomfort has worsened without injury. He is also having some increasing left knee discomfort. He is having the knee give way on him at times. He does have known patellofemoral arthritis of the knee. He was recently seen by Dr. Deborah Ortiz for his back. He does have some radicular symptomatology as well. He was placed on some new medicine. PHYSICAL EXAMINATION:  In general, the patient is alert and oriented x 3 in no acute distress. The patient is well-developed, well-nourished, with a normal affect. The patient is afebrile. HEENT:  Head is normocephalic and atraumatic. Pupils are equally round and reactive to light and accommodation. Extraocular eye movements are intact. Neck is supple. Trachea is midline. No JVD is present. Breathing is nonlabored. Examination of the lower extremities reveals pain-free range of motion of the hips. He does have discomfort to palpation of the greater trochanteric bursae on the left side. There is negative straight leg raise. There is negative calf tenderness. There is negative Isabella's. There is no evidence of DVT present. The left knee reveals the skin is intact. There is no ecchymosis and no warmth. There are no signs for infection or cellulitis present. He does have some mild crepitus anteriorly with range of motion activities. There is no palpable click present with Lily's sign. There is no pain elicited today. Range of motion is well preserved. ASSESSMENT:      1. Status post left total hip revision. 2. Trochanteric bursitis left hip. 3. Left knee arthritis. 4. Left knee pain. PLAN:  At this point, we are going to move forward with a cortisone injection for the left hip today. After informed and written consent with an appropriate time out performed, and under sterile conditions, with ultrasound-guided assistance, the left hip was prepped with Betadine and 6 mg of Celestone was injected to the area of the trochanteric bursa without complications. The patient tolerated the injection well. The patient was instructed on post injection care. With regards to his knee, he wants to give it a little time to see if the medication he was placed on from Dr. Carmelita Clark will help with his discomfort. If he does continue to have mechanical symptomatology, we may need to move forward with an MRI of the knee. We will see him back in about three months' time for evaluation.   He was given a one-week supply refill of his Corrinne Cunning, PA-C, ATC

## 2019-09-17 ENCOUNTER — OFFICE VISIT (OUTPATIENT)
Dept: ORTHOPEDIC SURGERY | Age: 64
End: 2019-09-17

## 2019-09-17 VITALS
SYSTOLIC BLOOD PRESSURE: 126 MMHG | TEMPERATURE: 97.7 F | OXYGEN SATURATION: 99 % | DIASTOLIC BLOOD PRESSURE: 63 MMHG | RESPIRATION RATE: 16 BRPM | BODY MASS INDEX: 24.59 KG/M2 | HEIGHT: 69 IN | HEART RATE: 57 BPM | WEIGHT: 166 LBS

## 2019-09-17 DIAGNOSIS — M47.817 LUMBOSACRAL SPONDYLOSIS WITHOUT MYELOPATHY: Primary | ICD-10-CM

## 2019-09-17 DIAGNOSIS — M17.12 OSTEOARTHRITIS OF LEFT KNEE, UNSPECIFIED OSTEOARTHRITIS TYPE: ICD-10-CM

## 2019-09-17 DIAGNOSIS — M51.36 OTHER INTERVERTEBRAL DISC DEGENERATION, LUMBAR REGION: ICD-10-CM

## 2019-09-17 RX ORDER — GABAPENTIN 300 MG/1
300 CAPSULE ORAL 3 TIMES DAILY
Qty: 270 CAP | Refills: 1 | Status: SHIPPED | OUTPATIENT
Start: 2019-09-17

## 2019-09-17 NOTE — LETTER
9/17/19 Patient: Tony Dejesus. YOB: 1955 Date of Visit: 9/17/2019 Catracho Brewer MD 
[id number] [place] [address] VIA Facsimile: [phone] Dear Catracho Brewer MD, Thank you for referring Mr. Jean Marie Patel to [address] [place] [address] for evaluation. My notes for this consultation are attached. If you have questions, please do not hesitate to call me. I look forward to following your patient along with you. Sincerely, Michell Talley MD

## 2019-09-17 NOTE — PROGRESS NOTES
[facility]  [address], [place] [address] [place] [address]  Phone: [phone]  Fax: [phone]        PROGRESS NOTE      HISTORY OF PRESENT ILLNESS:  The patient is a 59 y.o. male and was seen today for follow up of  left hip pain extending circumferentially into the LLE to the knee since January 2018. Initially had c/o low back pain extending in roughly an L5 distribution into the bilateral lower extremities. He endorses low back pain first thing in the morning. The patient states he is doing well. He is followed by Abbey Leos. [address] [facility] for bilateral total hip replacement. Note from Dr. Luciano Kumar dated 3/14/16 indicates patient needs a new left hip replacement. Patient states he plans to have left total hip replacement revision done after July. Note from Beth Reynoso dated 10/20/2016 reveals pt had follow up revision of left total hip replacement on 8/3/2016. Pt reports that since the hip replacement his pain has improved. Patient is prescribed Norco by Dr. Luciano Kumar. Pt states he stopped his Neurontin after his hip surgery and is doing well off the medication. He has completed PT. Note from Edgardo dated 1/15/18 reviewed. His note indicated patient was seen with c/o left hip pain. At that time he underwent a left trochanteric injection without benefit. Patient reports he will f/u with RENEA Reynoso in 3 months. Note from LUCY Reynoso dated 5/24/19 indicating patient was seen status post right hip replacement. He has had a left hip replacement revision. He does have a little trochanteric bursitis. Performed cortisone injection for left hip that day. Treated with [place]. Lumbar spine MRI dated 12/18/12. Per report revealed degenerative changes in the lumbar spine remaining relatively stable, most pronounced at L4-L5 where there may be mass effect upon the crossing L5 nerve roots bilaterally and moderate left-sided foraminal stenosis.  At his last clinic appointment, I restarted him on Neurontin 300 mg TID. The patient returns today with pain location and distribution remain unchanged. He rates his pain 0/10, previously 2-4/10. Pt is complaint with Neurontin 300 mg TID with benefit. His primary complaint at this time is his left knee. Pt denies change in bowel or bladder habits. Note from [place], [place] dated 8/30/19 indicating patient was seen with c/o left knee pain knee that was giving away. Indicated he had patellofemoral arthritis of the knee. Injected the left hip at that time. Indicated that if pt continue to have mechanical symptomatology, we may need to move forward with an MRI of the knee.  reviewed. Body mass index is 24.51 kg/m². PCP: Naun Russell MD      Past Medical History:   Diagnosis Date    Arthritis     Balance problems     High cholesterol     Low back pain     Migraine     Right hip pain     Sleep apnea     does not use cpap        Social History     Socioeconomic History    Marital status:      Spouse name: Not on file    Number of children: Not on file    Years of education: Not on file    Highest education level: Not on file   Occupational History    Not on file   Social Needs    Financial resource strain: Not on file    Food insecurity:     Worry: Not on file     Inability: Not on file    Transportation needs:     Medical: Not on file     Non-medical: Not on file   Tobacco Use    Smoking status: Never Smoker    Smokeless tobacco: Never Used   Substance and Sexual Activity    Alcohol use:  Yes     Alcohol/week: 6.0 standard drinks     Types: 6 Cans of beer per week     Comment: week    Drug use: Yes     Types: Marijuana     Comment: last 7/19/2015    Sexual activity: Not on file   Lifestyle    Physical activity:     Days per week: Not on file     Minutes per session: Not on file    Stress: Not on file   Relationships    Social connections:     Talks on phone: Not on file     Gets together: Not on file     Attends Pentecostalism service: Not on file     Active member of club or organization: Not on file     Attends meetings of clubs or organizations: Not on file     Relationship status: Not on file    Intimate partner violence:     Fear of current or ex partner: Not on file     Emotionally abused: Not on file     Physically abused: Not on file     Forced sexual activity: Not on file   Other Topics Concern    Not on file   Social History Narrative    Not on file       Current Outpatient Medications   Medication Sig Dispense Refill    gabapentin (NEURONTIN) 300 mg capsule Take 1 Cap by mouth three (3) times daily. Max Daily Amount: 900 mg. 90 Cap 1    gabapentin (NEURONTIN) 300 mg capsule Take 300 mg by mouth three (3) times daily.  HYDROcodone-acetaminophen (NORCO) 5-325 mg per tablet Take 1 Tab by mouth every six (6) hours as needed for Pain. Max Daily Amount: 4 Tabs. 28 Tab 0    meloxicam (MOBIC) 15 mg tablet Take 1 Tab by mouth daily. 90 Tab 0    ibuprofen (MOTRIN) 200 mg tablet Take 400 mg by mouth every six (6) hours as needed for Pain.  atorvastatin (LIPITOR) 40 mg tablet Take 1 Tab by mouth.  aspirin (ASPIRIN) 325 mg tablet Take 1 Tab by mouth two (2) times a day. 60 Tab 0       No Known Allergies       PHYSICAL EXAMINATION    Visit Vitals  /63 (BP 1 Location: Left arm, BP Patient Position: Sitting)   Pulse (!) 57   Temp 97.7 °F (36.5 °C) (Oral)   Resp 16   Ht 5' 9\" (1.753 m)   Wt 166 lb (75.3 kg)   SpO2 99%   BMI 24.51 kg/m²       CONSTITUTIONAL: NAD, A&O x 3  SENSATION: Intact to light touch throughout  RANGE OF MOTION: The patient has full passive range of motion in all four extremities. MOTOR:  Straight Leg Raise: Negative, bilateral   Increased pain in left knee with active flexion and extension               Hip Flex Knee Ext Knee Flex Ankle DF GTE Ankle PF Tone   Right +4/5 +4/5 +4/5 +4/5 +4/5 +4/5 +4/5   Left +4/5 +4/5 +4/5 +4/5 +4/5 +4/5 +4/5       ASSESSMENT   Diagnoses and all orders for this visit:    1. Lumbosacral spondylosis without myelopathy    2. Other intervertebral disc degeneration, lumbar region    3. Osteoarthritis of left knee, unspecified osteoarthritis type          IMPRESSION AND PLAN:  Patient returns today with primary c/o left knee pain. Pt is already being treated and evaluated for his left knee and hip pain. I recommended he continue to follow with Gina Baker for this. He reports his low back pain is doing well. Patient wished to continue his current treatment. I provided him with refills of Neurontin at this time. Patient is neurologically intact. I will see the patient back in 6 month's time or earlier if needed. Written by Bernie Martin, as dictated by Lora Durant MD  I examined the patient, reviewed and agree with the note.

## 2019-10-31 ENCOUNTER — OFFICE VISIT (OUTPATIENT)
Dept: ORTHOPEDIC SURGERY | Age: 64
End: 2019-10-31

## 2019-10-31 VITALS
HEART RATE: 63 BPM | TEMPERATURE: 98.1 F | OXYGEN SATURATION: 99 % | SYSTOLIC BLOOD PRESSURE: 139 MMHG | DIASTOLIC BLOOD PRESSURE: 74 MMHG | WEIGHT: 166.4 LBS | HEIGHT: 69 IN | RESPIRATION RATE: 16 BRPM | BODY MASS INDEX: 24.65 KG/M2

## 2019-10-31 DIAGNOSIS — Z96.643 HISTORY OF BILATERAL HIP REPLACEMENTS: ICD-10-CM

## 2019-10-31 DIAGNOSIS — M17.12 PATELLOFEMORAL ARTHRITIS OF LEFT KNEE: Primary | ICD-10-CM

## 2019-10-31 DIAGNOSIS — M25.562 LEFT KNEE PAIN, UNSPECIFIED CHRONICITY: ICD-10-CM

## 2019-10-31 RX ORDER — BETAMETHASONE SODIUM PHOSPHATE AND BETAMETHASONE ACETATE 3; 3 MG/ML; MG/ML
6 INJECTION, SUSPENSION INTRA-ARTICULAR; INTRALESIONAL; INTRAMUSCULAR; SOFT TISSUE ONCE
Qty: 1 ML | Refills: 0
Start: 2019-10-31 | End: 2019-10-31

## 2019-10-31 RX ORDER — TRAMADOL HYDROCHLORIDE 50 MG/1
50 TABLET ORAL
Qty: 21 TAB | Refills: 0 | Status: SHIPPED | OUTPATIENT
Start: 2019-10-31 | End: 2019-11-07

## 2019-10-31 NOTE — PROGRESS NOTES
Patient: Antwan Tello. MRN: [id number]       SSN: [id number]  YOB: 1955        AGE: 59 y.o. SEX: male  Body mass index is 24.57 kg/m². PCP: Jovana Neff MD  10/31/19    HISTORY OF PRESENT ILLNESS:  I had the pleasure of reviewing Hollie Fair today. As you know, he is a delightful gentleman. I revised his left hip that Dr. Aiden Hidalgo did for him. I replaced his right hip primarily. He has been having ongoing left knee problems. The pain is moderate, usually nonradicular. Estefani Riley gave him an injection for her bursitis of the hip and he is requesting an injection for the left knee for nonradicular left knee pain. He denies fevers or chills. He otherwise has been feeling well. REVIEW OF SYSTEMS:  The 12-point review of systems is performed today. Pertinent positives are noted. All other systems are reviewed and otherwise are negative. PHYSICAL EXAMINATION:  On examination, the hips are noncontributory today. Well healed incisions. Calf nontender. Isabella sign negative. Slight effusion in the knee, very little. He has some patellofemoral irritation with terminal extension. The medial and lateral joint lines are mildly tender. Fidel's test is just equivocal.  ACL 1+. Quads are intact. Mildly antalgic gait onto that left knee. RADIOGRAPHS:  Review of the x-rays reveal moderate patellofemoral arthritis. Otherwise just mild arthritis in the main weightbearing space proper. PROCEDURE NOTE:  Under aseptic conditions and after informed written consent with time out, left knee injected 1 mL Celestone preparation, i.e. 6 mg, well tolerated. IMPRESSION:  Left knee pain. Meniscal tear versus patellofemoral arthritis. I would recommend an injection to start, which he is very agreeable. He will return to see us in about 4 weeks' time. If he is not pleased with the injection, we will be proceeding to MRI evaluation.     It has been a pleasure to share in his care. We will certainly try to maximize his nonoperative management. CC:  Dr. Mellisa Nickerson:      CON: negative for weight loss, fever  EYE: negative for double vision  ENT: negative for hoarseness  RS:   negative for Tb  GI:    negative for blood in stool  :  negative for blood in urine  Other systems reviewed and noted below. Past Medical History:   Diagnosis Date    Arthritis     Balance problems     High cholesterol     Low back pain     Migraine     Right hip pain     Sleep apnea     does not use cpap       Family History   Problem Relation Age of Onset    Diabetes Mother     Cancer Mother     Cancer Sister     Diabetes Sister     Hypertension Other     Heart Disease Other     Arthritis-osteo Other        Current Outpatient Medications   Medication Sig Dispense Refill    gabapentin (NEURONTIN) 300 mg capsule Take 1 Cap by mouth three (3) times daily. Max Daily Amount: 900 mg. 270 Cap 1    gabapentin (NEURONTIN) 300 mg capsule Take 300 mg by mouth three (3) times daily.  HYDROcodone-acetaminophen (NORCO) 5-325 mg per tablet Take 1 Tab by mouth every six (6) hours as needed for Pain. Max Daily Amount: 4 Tabs. 28 Tab 0    meloxicam (MOBIC) 15 mg tablet Take 1 Tab by mouth daily. 90 Tab 0    ibuprofen (MOTRIN) 200 mg tablet Take 400 mg by mouth every six (6) hours as needed for Pain.  atorvastatin (LIPITOR) 40 mg tablet Take 1 Tab by mouth.  aspirin (ASPIRIN) 325 mg tablet Take 1 Tab by mouth two (2) times a day.  61 Tab 0       No Known Allergies    Past Surgical History:   Procedure Laterality Date    HX HEENT      laser eye surgery left eye, prosthetic right eye    HX HIP REPLACEMENT Bilateral     HX ORTHOPAEDIC      right hip replacement, left hip replacement    HX OTHER SURGICAL      Lumbar Compression    HX REFRACTIVE SURGERY         Social History     Socioeconomic History    Marital status:      Spouse name: Not on file    Number of children: Not on file    Years of education: Not on file    Highest education level: Not on file   Occupational History    Not on file   Social Needs    Financial resource strain: Not on file    Food insecurity:     Worry: Not on file     Inability: Not on file    Transportation needs:     Medical: Not on file     Non-medical: Not on file   Tobacco Use    Smoking status: Never Smoker    Smokeless tobacco: Never Used   Substance and Sexual Activity    Alcohol use: Yes     Alcohol/week: 6.0 standard drinks     Types: 6 Cans of beer per week     Comment: week    Drug use: Yes     Types: Marijuana     Comment: last 7/19/2015    Sexual activity: Not on file   Lifestyle    Physical activity:     Days per week: Not on file     Minutes per session: Not on file    Stress: Not on file   Relationships    Social connections:     Talks on phone: Not on file     Gets together: Not on file     Attends Pentecostalism service: Not on file     Active member of club or organization: Not on file     Attends meetings of clubs or organizations: Not on file     Relationship status: Not on file    Intimate partner violence:     Fear of current or ex partner: Not on file     Emotionally abused: Not on file     Physically abused: Not on file     Forced sexual activity: Not on file   Other Topics Concern    Not on file   Social History Narrative    Not on file       Visit Vitals  /74   Pulse 63   Temp 98.1 °F (36.7 °C) (Oral)   Resp 16   Ht 5' 9\" (1.753 m)   Wt 166 lb 6.4 oz (75.5 kg)   SpO2 99%   BMI 24.57 kg/m²         PHYSICAL EXAMINATION:  GENERAL: Alert and oriented x3, in no acute distress, well-developed, well-nourished, afebrile. HEART: No JVD. EYES: No scleral icterus   NECK: No significant lymphadenopathy   LUNGS: No respiratory compromise or indrawing  ABDOMEN: Soft, non-tender, non-distended. Electronically signed by:  Nola Nuñez MD

## 2019-10-31 NOTE — PROGRESS NOTES
1. Have you been to the ER, urgent care clinic since your last visit? Hospitalized since your last visit? No    2. Have you seen or consulted any other health care providers outside of the [address] [place] [address] since your last visit? Include any pap smears or colon screening.  No

## 2020-01-23 ENCOUNTER — OFFICE VISIT (OUTPATIENT)
Dept: ORTHOPEDIC SURGERY | Age: 65
End: 2020-01-23

## 2020-01-23 VITALS
DIASTOLIC BLOOD PRESSURE: 69 MMHG | HEART RATE: 72 BPM | HEIGHT: 69 IN | SYSTOLIC BLOOD PRESSURE: 135 MMHG | OXYGEN SATURATION: 99 % | WEIGHT: 174.4 LBS | BODY MASS INDEX: 25.83 KG/M2 | RESPIRATION RATE: 16 BRPM | TEMPERATURE: 98.7 F

## 2020-01-23 DIAGNOSIS — M17.12 OSTEOARTHRITIS OF LEFT KNEE, UNSPECIFIED OSTEOARTHRITIS TYPE: Primary | ICD-10-CM

## 2020-01-23 DIAGNOSIS — M25.562 LEFT KNEE PAIN, UNSPECIFIED CHRONICITY: ICD-10-CM

## 2020-01-23 RX ORDER — HYDROCODONE BITARTRATE AND ACETAMINOPHEN 7.5; 325 MG/1; MG/1
1 TABLET ORAL
Qty: 21 TAB | Refills: 0 | Status: SHIPPED | OUTPATIENT
Start: 2020-01-23 | End: 2020-01-30

## 2020-01-23 RX ORDER — BETAMETHASONE SODIUM PHOSPHATE AND BETAMETHASONE ACETATE 3; 3 MG/ML; MG/ML
6 INJECTION, SUSPENSION INTRA-ARTICULAR; INTRALESIONAL; INTRAMUSCULAR; SOFT TISSUE ONCE
Qty: 1 ML | Refills: 0
Start: 2020-01-23 | End: 2020-01-23

## 2020-01-23 NOTE — PROGRESS NOTES
1. Have you been to the ER, urgent care clinic since your last visit? Hospitalized since your last visit? No    2. Have you seen or consulted any other health care providers outside of the [address] [place] [address] since your last visit? Include any pap smears or colon screening.  No

## 2020-01-23 NOTE — PROGRESS NOTES
[address] [place] [address]  [phone]           Patient: Steffi Duff. MRN: [id number]       SSN: [id number]  YOB: 1955        AGE: 62 y.o. SEX: male  Body mass index is 25.75 kg/m². PCP: Nahed Clay MD  01/23/20      This office note has been dictated. REVIEW OF SYSTEMS:  Constitutional: Negative for fever, chills, weight loss and malaise/fatigue. HENT: Negative. Eyes: Negative. Respiratory: Negative. Cardiovascular: Negative. Gastrointestinal: No bowel incontinence or constipation. Genitourinary: No bladder incontinence or saddle anesthesia. Skin: Negative. Neurological: Negative. Endo/Heme/Allergies: Negative. Psychiatric/Behavioral: Negative. Musculoskeletal: As per HPI above. Past Medical History:   Diagnosis Date    Arthritis     Balance problems     High cholesterol     Low back pain     Migraine     Right hip pain     Sleep apnea     does not use cpap         Current Outpatient Medications:     gabapentin (NEURONTIN) 300 mg capsule, Take 1 Cap by mouth three (3) times daily. Max Daily Amount: 900 mg., Disp: 270 Cap, Rfl: 1    gabapentin (NEURONTIN) 300 mg capsule, Take 300 mg by mouth three (3) times daily. , Disp: , Rfl:     meloxicam (MOBIC) 15 mg tablet, Take 1 Tab by mouth daily. , Disp: 90 Tab, Rfl: 0    ibuprofen (MOTRIN) 200 mg tablet, Take 400 mg by mouth every six (6) hours as needed for Pain., Disp: , Rfl:     atorvastatin (LIPITOR) 40 mg tablet, Take 1 Tab by mouth., Disp: , Rfl:     aspirin (ASPIRIN) 325 mg tablet, Take 1 Tab by mouth two (2) times a day., Disp: 60 Tab, Rfl: 0    HYDROcodone-acetaminophen (NORCO) 5-325 mg per tablet, Take 1 Tab by mouth every six (6) hours as needed for Pain.  Max Daily Amount: 4 Tabs., Disp: 28 Tab, Rfl: 0    No Known Allergies    Social History     Socioeconomic History    Marital status:      Spouse name: Not on file    Number of children: Not on file    Years of education: Not on file    Highest education level: Not on file   Occupational History    Not on file   Social Needs    Financial resource strain: Not on file    Food insecurity:     Worry: Not on file     Inability: Not on file    Transportation needs:     Medical: Not on file     Non-medical: Not on file   Tobacco Use    Smoking status: Never Smoker    Smokeless tobacco: Never Used   Substance and Sexual Activity    Alcohol use:  Yes     Alcohol/week: 6.0 standard drinks     Types: 6 Cans of beer per week     Comment: week    Drug use: Yes     Types: Marijuana     Comment: last 7/19/2015    Sexual activity: Not on file   Lifestyle    Physical activity:     Days per week: Not on file     Minutes per session: Not on file    Stress: Not on file   Relationships    Social connections:     Talks on phone: Not on file     Gets together: Not on file     Attends Tenriism service: Not on file     Active member of club or organization: Not on file     Attends meetings of clubs or organizations: Not on file     Relationship status: Not on file    Intimate partner violence:     Fear of current or ex partner: Not on file     Emotionally abused: Not on file     Physically abused: Not on file     Forced sexual activity: Not on file   Other Topics Concern    Not on file   Social History Narrative    Not on file       Past Surgical History:   Procedure Laterality Date    HX HEENT      laser eye surgery left eye, prosthetic right eye    HX HIP REPLACEMENT Bilateral     HX ORTHOPAEDIC      right hip replacement, left hip replacement    HX OTHER SURGICAL      Lumbar Compression    HX REFRACTIVE SURGERY             * Patient was identified by name and date of birth   * Agreement on procedure being performed was verified  * Risks and Benefits explained to the patient  * Procedure site verified and marked as necessary  * Patient was positioned for comfort  * Consent was signed and verified  4:02 PM    The patient was instructed on post injection care. We did see Mr. Edy Sosa today for followup with regards to bilateral hip replacements, as well as his left knee. He is doing well from the hip replacements. He does have some discomfort to the left side at times, more so in the lateral aspect from the bursitis. It has been quiescent. He does have a little pain with lying on either side at night. He has no radiating pain down the lower extremities and no fevers, chills, systemic changes, or injuries to report. With regards to the left knee, he has increasing discomfort and trouble getting up from a chair and going up and down stairs. He is unable to kneel on the left knee. He has had cortisone injections in the past and is requesting repeat injection for the left knee today. PHYSICAL EXAMINATION:  In general, the patient is alert and oriented x 3 in no acute distress. The patient is well-developed, well-nourished, with a normal affect. The patient is afebrile. HEENT:  Head is normocephalic and atraumatic. Pupils are equally round and reactive to light and accommodation. Extraocular eye movements are intact. Neck is supple. Trachea is midline. No JVD is present. Breathing is nonlabored. Examination of the lower extremities reveals pain-free range of motion of the hips. There is no pain to palpation of the greater trochanteric bursae. There is negative straight leg raise. There is negative calf tenderness. There is negative Isabella's. There is no evidence of DVT present. The left knee reveals the skin is intact. There is no ecchymosis and no warmth. There is negative joint effusion, negative patellar ballottement, and no signs for infection. He does have some crepitus anteriorly with range of motion activities noted and discomfort with patellofemoral grind. The medial and lateral joint lines are nontender today. ASSESSMENT:      1. Bilateral hip replacements. 2. Trochanteric bursitis bilateral hips, mild. 3. Left knee patellofemoral arthritis. PLAN:  At this point, we discussed treatment options. We are going to move forward with a cortisone injection for his left knee today. After informed and written consent with an appropriate time out performed, and under sterile conditions, with ultrasound-guided assistance, the left knee was prepped with Betadine and 6 mg of Celestone was injected without complications. The patient tolerated the injection well. The patient was instructed on post injection care. We will see him back in the office in about three months' time for evaluation.   He was given a one-week supply of Lisa RAMIREZ PA-C, ATC

## 2020-04-23 ENCOUNTER — VIRTUAL VISIT (OUTPATIENT)
Dept: ORTHOPEDIC SURGERY | Age: 65
End: 2020-04-23

## 2020-04-23 DIAGNOSIS — Z96.643 HISTORY OF BILATERAL HIP REPLACEMENTS: ICD-10-CM

## 2020-04-23 DIAGNOSIS — M17.12 OSTEOARTHRITIS OF LEFT KNEE, UNSPECIFIED OSTEOARTHRITIS TYPE: Primary | ICD-10-CM

## 2020-04-23 DIAGNOSIS — M70.62 TROCHANTERIC BURSITIS OF LEFT HIP: ICD-10-CM

## 2020-04-23 RX ORDER — NAPROXEN 500 MG/1
500 TABLET ORAL 2 TIMES DAILY WITH MEALS
Qty: 60 TAB | Refills: 2 | Status: SHIPPED | OUTPATIENT
Start: 2020-04-23

## 2020-04-23 NOTE — PROGRESS NOTES
Patient: Star Fowler MRN: [id number]       SSN: [id number]  YOB: 1955        AGE: 62 y.o. SEX: male  There is no height or weight on file to calculate BMI. PCP: Soledad Walters MD  04/23/20  8:58 AM    Consent:  Star Fowler and/or health care decision maker is aware that that they may receive a bill for this virtual service, depending on their insurance coverage, and has provided verbal consent to proceed: Yes    Star Fowler is a 62 y.o. male who was seen by synchronous (real-time) audio-video technology (doxy. me) on 4/23/2020. Patient was at home and provider at home office while conducting this encounter. Patient seen and evaluated today via virtual visit, telehealth, doxy me in which 15 minutes was spent for this established patient and of that 15 minutes was 50% of the time was spent in counseling and/or coordinating care regarding bilateral hip replacements, trochanteric bursitis, and left knee osteoarthritis. REVIEW OF SYSTEMS:      CON: negative  EYE: negative   ENT: negative  RESP: negative  GI:    negative   :  negative  MSK: Positive  A twelve point review of systems was completed, positives noted and all other systems were reviewed and are negative          Past Medical History:   Diagnosis Date    Arthritis     Balance problems     High cholesterol     Low back pain     Migraine     Right hip pain     Sleep apnea     does not use cpap       Family History   Problem Relation Age of Onset    Diabetes Mother     Cancer Mother     Cancer Sister     Diabetes Sister     Hypertension Other     Heart Disease Other     Arthritis-osteo Other        Current Outpatient Medications   Medication Sig Dispense Refill    naproxen (NAPROSYN) 500 mg tablet Take 1 Tab by mouth two (2) times daily (with meals). 60 Tab 2    gabapentin (NEURONTIN) 300 mg capsule Take 1 Cap by mouth three (3) times daily.  Max Daily Amount: 900 mg. 270 Cap 1    gabapentin (NEURONTIN) 300 mg capsule Take 300 mg by mouth three (3) times daily.  HYDROcodone-acetaminophen (NORCO) 5-325 mg per tablet Take 1 Tab by mouth every six (6) hours as needed for Pain. Max Daily Amount: 4 Tabs. 28 Tab 0    meloxicam (MOBIC) 15 mg tablet Take 1 Tab by mouth daily. 90 Tab 0    ibuprofen (MOTRIN) 200 mg tablet Take 400 mg by mouth every six (6) hours as needed for Pain.  atorvastatin (LIPITOR) 40 mg tablet Take 1 Tab by mouth.  aspirin (ASPIRIN) 325 mg tablet Take 1 Tab by mouth two (2) times a day. 61 Tab 0       No Known Allergies    Past Surgical History:   Procedure Laterality Date    HX HEENT      laser eye surgery left eye, prosthetic right eye    HX HIP REPLACEMENT Bilateral     HX ORTHOPAEDIC      right hip replacement, left hip replacement    HX OTHER SURGICAL      Lumbar Compression    HX REFRACTIVE SURGERY         Social History     Socioeconomic History    Marital status:      Spouse name: Not on file    Number of children: Not on file    Years of education: Not on file    Highest education level: Not on file   Occupational History    Not on file   Social Needs    Financial resource strain: Not on file    Food insecurity     Worry: Not on file     Inability: Not on file    Transportation needs     Medical: Not on file     Non-medical: Not on file   Tobacco Use    Smoking status: Never Smoker    Smokeless tobacco: Never Used   Substance and Sexual Activity    Alcohol use:  Yes     Alcohol/week: 6.0 standard drinks     Types: 6 Cans of beer per week     Comment: week    Drug use: Yes     Types: Marijuana     Comment: last 7/19/2015    Sexual activity: Not on file   Lifestyle    Physical activity     Days per week: Not on file     Minutes per session: Not on file    Stress: Not on file   Relationships    Social connections     Talks on phone: Not on file     Gets together: Not on file     Attends Zoroastrian service: Not on file     Active member of club or organization: Not on file     Attends meetings of clubs or organizations: Not on file     Relationship status: Not on file    Intimate partner violence     Fear of current or ex partner: Not on file     Emotionally abused: Not on file     Physically abused: Not on file     Forced sexual activity: Not on file   Other Topics Concern    Not on file   Social History Narrative    Not on file       There were no vitals taken for this visit. Patient seen and evaluated today via virtual visit, telehealth, doxy. me regarding bile hip replacements, trochanteric bursitis of the left hip, and left knee arthritis. At this point the patient is doing quite well he does have a little bit of stiffness in which his family physician placed him on naproxen which is working well for him. He would like a refill of the naproxen. He is able to ambulate and do his normal daily activities without complications currently. he denies any groin pain. He denies radiating pain down the lower extremities. There have been no change of bowel or bladder habits. Patient denies recent fevers, chills, chest pain, SOB, or injuries. No recent systemic changes noted. A 12-point review of systems is performed today. Pertinent positives are noted. All other systems reviewed and otherwise are negative. PHYSICAL EXAMINATION:  GENERAL: Alert and oriented x3, in no acute distress, well-developed, well-nourished, afebrile. HEART: No JVD. EYES: No scleral icterus   NECK: No significant lymphadenopathy   LUNGS: No respiratory compromise or indrawing    Assessment: #1 status post bilateral hip replacements, #2 left hip trochanteric bursitis, #3 left knee osteoarthritis    Plan: At this point, we will send a refill of naproxen to the pharmacy for him. He is instructed on use as well as usual precautions. He will continue activity as tolerated.   We will plan to see him in the office in approximate 6 weeks time for evaluation at which point obtain x-rays of each of his hips. We may consider cortisone injection for the left hip trochanter bursitis as well as left knee arthritis depending on his discomfort. He will call with any question concerns arise. Due to this being a TeleHealth evaluation, many elements of the physical examination are unable to be assessed. Pursuant to the emergency declaration under the [address] [place] [address] [facility] authority and the [employer] Act, this Virtual Visit was conducted, with patient's consent, to reduce the patient's risk of exposure to COVID-19 and provide continuity of care for an established patient. Services were provided through a virtual discussion to substitute for in-person clinic visit.     Electronically signed by: Barbara Connolly PA-C

## 2021-07-16 ENCOUNTER — OFFICE VISIT (OUTPATIENT)
Dept: ORTHOPEDIC SURGERY | Age: 66
End: 2021-07-16
Payer: MEDICARE

## 2021-07-16 VITALS
HEIGHT: 69 IN | BODY MASS INDEX: 27.7 KG/M2 | RESPIRATION RATE: 16 BRPM | OXYGEN SATURATION: 98 % | WEIGHT: 187 LBS | HEART RATE: 68 BPM | TEMPERATURE: 98 F

## 2021-07-16 DIAGNOSIS — Z96.643 HISTORY OF BILATERAL HIP REPLACEMENTS: ICD-10-CM

## 2021-07-16 DIAGNOSIS — M54.50 LUMBAR PAIN: ICD-10-CM

## 2021-07-16 DIAGNOSIS — M70.61 TROCHANTERIC BURSITIS OF RIGHT HIP: ICD-10-CM

## 2021-07-16 DIAGNOSIS — M70.62 TROCHANTERIC BURSITIS OF LEFT HIP: Primary | ICD-10-CM

## 2021-07-16 PROCEDURE — G8432 DEP SCR NOT DOC, RNG: HCPCS | Performed by: PHYSICIAN ASSISTANT

## 2021-07-16 PROCEDURE — 20611 DRAIN/INJ JOINT/BURSA W/US: CPT | Performed by: PHYSICIAN ASSISTANT

## 2021-07-16 PROCEDURE — 99214 OFFICE O/P EST MOD 30 MIN: CPT | Performed by: PHYSICIAN ASSISTANT

## 2021-07-16 PROCEDURE — G8427 DOCREV CUR MEDS BY ELIG CLIN: HCPCS | Performed by: PHYSICIAN ASSISTANT

## 2021-07-16 PROCEDURE — 3017F COLORECTAL CA SCREEN DOC REV: CPT | Performed by: PHYSICIAN ASSISTANT

## 2021-07-16 PROCEDURE — 73522 X-RAY EXAM HIPS BI 3-4 VIEWS: CPT | Performed by: PHYSICIAN ASSISTANT

## 2021-07-16 PROCEDURE — G8419 CALC BMI OUT NRM PARAM NOF/U: HCPCS | Performed by: PHYSICIAN ASSISTANT

## 2021-07-16 PROCEDURE — 72100 X-RAY EXAM L-S SPINE 2/3 VWS: CPT | Performed by: PHYSICIAN ASSISTANT

## 2021-07-16 RX ORDER — BETAMETHASONE SODIUM PHOSPHATE AND BETAMETHASONE ACETATE 3; 3 MG/ML; MG/ML
12 INJECTION, SUSPENSION INTRA-ARTICULAR; INTRALESIONAL; INTRAMUSCULAR; SOFT TISSUE ONCE
Status: COMPLETED | OUTPATIENT
Start: 2021-07-16 | End: 2021-07-16

## 2021-07-16 RX ADMIN — BETAMETHASONE SODIUM PHOSPHATE AND BETAMETHASONE ACETATE 12 MG: 3; 3 INJECTION, SUSPENSION INTRA-ARTICULAR; INTRALESIONAL; INTRAMUSCULAR; SOFT TISSUE at 11:40

## 2021-07-16 NOTE — PROGRESS NOTES
[address] [place] [address]  [phone]           Patient: Brooklynn Jensen. MRN: [id number]       SSN: [id number]  YOB: 1955        AGE: 60 y.o. SEX: male  Body mass index is 27.62 kg/m². PCP: Kathryn Urbano MD  07/16/21            REVIEW OF SYSTEMS:  Constitutional: Negative for fever, chills, weight loss and malaise/fatigue. HENT: Negative. Eyes: Negative. Respiratory: Negative. Cardiovascular: Negative. Gastrointestinal: No bowel incontinence or constipation. Genitourinary: No bladder incontinence or saddle anesthesia. Skin: Negative. Neurological: Negative. Endo/Heme/Allergies: Negative. Psychiatric/Behavioral: Negative. Musculoskeletal: As per HPI above. Past Medical History:   Diagnosis Date    Arthritis     Balance problems     High cholesterol     Low back pain     Migraine     Right hip pain     Sleep apnea     does not use cpap         Current Outpatient Medications:     naproxen (NAPROSYN) 500 mg tablet, Take 1 Tab by mouth two (2) times daily (with meals). , Disp: 60 Tab, Rfl: 2    gabapentin (NEURONTIN) 300 mg capsule, Take 1 Cap by mouth three (3) times daily. Max Daily Amount: 900 mg., Disp: 270 Cap, Rfl: 1    gabapentin (NEURONTIN) 300 mg capsule, Take 300 mg by mouth three (3) times daily. , Disp: , Rfl:     HYDROcodone-acetaminophen (NORCO) 5-325 mg per tablet, Take 1 Tab by mouth every six (6) hours as needed for Pain. Max Daily Amount: 4 Tabs., Disp: 28 Tab, Rfl: 0    meloxicam (MOBIC) 15 mg tablet, Take 1 Tab by mouth daily. , Disp: 90 Tab, Rfl: 0    ibuprofen (MOTRIN) 200 mg tablet, Take 400 mg by mouth every six (6) hours as needed for Pain., Disp: , Rfl:     atorvastatin (LIPITOR) 40 mg tablet, Take 1 Tab by mouth., Disp: , Rfl:     aspirin (ASPIRIN) 325 mg tablet, Take 1 Tab by mouth two (2) times a day., Disp: 60 Tab, Rfl: 0    Current Facility-Administered Medications:     betamethasone (CELESTONE) injection 12 mg, 12 mg, IntraBURSal, ONCE, Elmira Owens PA-C    No Known Allergies    Social History     Socioeconomic History    Marital status:      Spouse name: Not on file    Number of children: Not on file    Years of education: Not on file    Highest education level: Not on file   Occupational History    Not on file   Tobacco Use    Smoking status: Never Smoker    Smokeless tobacco: Never Used   Substance and Sexual Activity    Alcohol use: Yes     Alcohol/week: 6.0 standard drinks     Types: 6 Cans of beer per week     Comment: week    Drug use: Yes     Types: Marijuana     Comment: last 7/19/2015    Sexual activity: Not on file   Other Topics Concern    Not on file   Social History Narrative    Not on file     Social Determinants of Health     Financial Resource Strain:     Difficulty of Paying Living Expenses:    Food Insecurity:     Worried About Running Out of Food in the Last Year:     [address] in the Last Year:    Transportation Needs:     Lack of Transportation (Medical):      Lack of Transportation (Non-Medical):    Physical Activity:     Days of Exercise per Week:     Minutes of Exercise per Session:    Stress:     Feeling of Stress :    Social Connections:     Frequency of Communication with Friends and Family:     Frequency of Social Gatherings with Friends and Family:     Attends [employer] Services:     Active Member of Clubs or Organizations:     Attends Club or Organization Meetings:     Marital Status:    Intimate Partner Violence:     Fear of Current or Ex-Partner:     Emotionally Abused:     Physically Abused:     Sexually Abused:        Past Surgical History:   Procedure Laterality Date    HX HEENT      laser eye surgery left eye, prosthetic right eye    HX HIP REPLACEMENT Bilateral     HX ORTHOPAEDIC      right hip replacement, left hip replacement    HX OTHER SURGICAL Lumbar Compression    HX REFRACTIVE SURGERY           Patient seen evaluate today for bilateral hips as well as his low back. He does have a history of compression fracture at L2 in his low back. Is been having some radiating pain down his left lower extremity without injury. No change in bowel or bladder habits. He is status post bilateral hip replacements. He has a history of trochanteric bursitis. It started bothering him about a month or so ago. No injury. He denies any groin pain. Patient denies recent fevers, chills, chest pain, SOB, or injuries. No recent systemic changes noted. A 12-point review of systems is performed today. Pertinent positives are noted. All other systems reviewed and otherwise are negative. Physical exam: General: Alert and oriented x3, nad.  well-developed, well nourished. normal affect, AF. NC/AT, EOMI, neck supple, trachea midline, no JVD present. Breathing is non-labored. Examination of the lower extremities reveals pain-free range of motion of the hips. He does have some discomfort to palpation of the trochanter bursa of each of the hips. He has negative straight leg raise. Negative calf tenderness. Negative Homans. No signs of DVT present. Abduction strength is symmetric bilaterally. Radiographs in office today 7/16/2021 at the [facility] location including AP and lateral lumbar spine shows multilevel degenerative changes with a history of an L2 compression fracture noted. An AP pelvis as well as an AP and crosstable lateral each of the hips were obtained showing the total hip components to be well fixed without evidence for loosening or fracture noted. He does have some heterotopic ossification noted on the left hip arising from the greater trochanter to the pelvic acetabular rim. Assessment: #1 lumbar degenerative disc disease, radiculopathy #2 bilateral hip replacements, #3 bilateral hip trochanter bursitis    Plan:  At this point, we will move for the cortisone injection for each of the hips, trochanteric bursa. After informed consent, under aseptic conditions, with US guided assitance, each hip was prepped with betadine and a mxiture of 3ml 1% lidocaine and 6mg of celestone was injected without complications. The patient tolerated the injection well. The patient is instructed on post-injection care. Surgical invention was discussed regarding heterotopic ossification of the left hip and elected against at this time. An MRI of the lumbar spine will be ordered as well as referral to Dr. Lolita Mukherjee for further evaluation of his back issues. Chart reviewed for the following:  Nicholas GUIDRY PA-C, have reviewed the History, Physical and updated the Allergic reactions for Shyam Rosado.? TIME OUT performed immediately prior to start of procedure:  Nicholas GUIDRY PA-C, have performed the following reviews on Shyam Rosado. prior to the start of the procedure:  ????????  * Patient was identified by name and date of birth   * Agreement on procedure being performed was verified  * Risks and Benefits explained to the patient  * Procedure site verified and marked as necessary  * Patient was positioned for comfort  * Consent was signed and verified    Time:11:36 AM    Body part: bilateral hips, intra-bursal    Medication & Dose: 3ml 1% lidocaine and 6mg celestone each    Date of procedure: 07/16/21    Procedure performed by: Nicholas Bucio PA-C    Provider assisted by: none    Patient assisted by: self    How tolerated by patient: tolerated the procedure well with no complications    Post Procedural Pain Scale: 6    Comments:   [address] using a frequency of 10MHz with a 12L-RS transducer head was used to confirm needle placement.   Ultrasound images captured using [address] Ultrasound machine and scanned into patient's chart       Mick Choe PA-C, ATC

## 2021-07-26 ENCOUNTER — HOSPITAL ENCOUNTER (OUTPATIENT)
Age: 66
Discharge: HOME | End: 2021-07-26
Attending: PHYSICIAN ASSISTANT
Payer: MEDICARE

## 2021-07-26 DIAGNOSIS — M54.50 LUMBAR PAIN: ICD-10-CM

## 2021-07-26 PROCEDURE — 72148 MRI LUMBAR SPINE W/O DYE: CPT

## 2021-08-10 NOTE — PROGRESS NOTES
[facility]  [address], [address]  Phone: [phone]  Fax: [phone]        PROGRESS NOTE      HISTORY OF PRESENT ILLNESS:  The patient is a 60 y.o. male and was seen today for follow up of left hip pain extending circumferentially into the LLE to the knee since January 2018. Initially had c/o low back pain extending in roughly an L5 distribution into the bilateral lower extremities. He endorses low back pain first thing in the morning. The patient states he is doing well. He is followed by Kevin Holley. Ramón MEIER for bilateral total hip replacement. Note from Dr. Ro Briceno dated 3/14/16 indicates patient needs a new left hip replacement. Patient states he plans to have left total hip replacement revision done after July. Note from Beth Hubbard dated 10/20/2016 reveals pt had follow up revision of left total hip replacement on 8/3/2016. Pt reports that since the hip replacement his pain has improved. Patient is prescribed Norco by Dr. Ro Briceno. Pt states he stopped his Neurontin after his hip surgery and is doing well off the medication. He has completed PT. Note from JR Owens dated 1/15/18 reviewed. His note indicated patient was seen with c/o left hip pain. At that time he underwent a left trochanteric injection without benefit. Patient reports he will f/u with RENEA Hubbard in 3 months. Note from JR Ramón Bowser NP dated 5/24/19 indicating patient was seen status post right hip replacement. He has had a left hip replacement revision. He does have a little trochanteric bursitis. Performed cortisone injection for left hip that day. Treated with Norco.  Note from Beth Hubbard dated 8/30/19 indicating patient was seen with c/o left knee pain knee that was giving away. Indicated he had patellofemoral arthritis of the knee. Injected the left hip at that time. Indicated that if pt continue to have mechanical symptomatology, we may need to move forward with an MRI of the knee. Lumbar spine MRI dated 12/18/12. Per report revealed degenerative changes in the lumbar spine remaining relatively stable, most pronounced at L4-L5 where there may be mass effect upon the crossing L5 nerve roots bilaterally and moderate left-sided foraminal stenosis. At his last clinic appointment, pt was already being treated and evaluated for his left knee and hip pain. I recommended he continue to follow with Gin Frye for this. He reported his low back pain is doing well. Patient had wished to continue his current treatment. I provided him with refills of Neurontin.         The patient returns today with gradual progression of low back pain radiating into the LLE in a L5 distribution to the ankle. He rates his pain 7/10, previously 0/10. He reports his pain is exacerbated by twisting his leg. He reports he ran out the Neurontin 300 mg TID. Pt has a remote hx of spinal injections. Pt reports h/o three hip replacements, LLE x 2 and RLE x 1. Since his last visit on 9/17/2019, pt reports a 27 Ibs weight loss. I had recommended he follow up with Gin Frye for his left knee. Note from [place], [place] dated 7/16/2021indicating patient was seen with c/o low back pain. His bilateral hips are doing well. Pt has h/o L2 compression fracture. Pain radiating into the LLE. At this point move forward with trochanteric injections bilaterally. Surgical invention was discussed regarding heterotopic ossification of the left hip and was elected against. An MRI of the lumbar spine was ordered. L spine MRI dated 7/26/2021 films independently reviewed. Per report,Interval worsening of mild to moderate multilevel degenerative changes. Mild central canal stenosis L1-2 through L2-3. Multilevel mild and moderate degrees foraminal stenosis as discussed. Stable morphology of L2 vertebral body where there is most likely underlying vertebral osseous hemangioma with mild compression deformity and expansion of the vertebral body.  Interval development of asymmetric atrophy of the left psoas and iliacus muscles       reviewed. Body mass index is 27.08 kg/m². PCP: Tiny Moraes MD      Past Medical History:   Diagnosis Date    Arthritis     Balance problems     High cholesterol     Low back pain     Migraine     Right hip pain     Sleep apnea     does not use cpap        Social History     Socioeconomic History    Marital status:      Spouse name: Not on file    Number of children: Not on file    Years of education: Not on file    Highest education level: Not on file   Occupational History    Not on file   Tobacco Use    Smoking status: Never Smoker    Smokeless tobacco: Never Used   Substance and Sexual Activity    Alcohol use: Yes     Alcohol/week: 6.0 standard drinks     Types: 6 Cans of beer per week     Comment: week    Drug use: Yes     Types: Marijuana     Comment: last 7/19/2015    Sexual activity: Not on file   Other Topics Concern    Not on file   Social History Narrative    Not on file     Social Determinants of Health     Financial Resource Strain:     Difficulty of Paying Living Expenses:    Food Insecurity:     Worried About Running Out of Food in the Last Year:     [address] in the Last Year:    Transportation Needs:     Lack of Transportation (Medical):      Lack of Transportation (Non-Medical):    Physical Activity:     Days of Exercise per Week:     Minutes of Exercise per Session:    Stress:     Feeling of Stress :    Social Connections:     Frequency of Communication with Friends and Family:     Frequency of Social Gatherings with Friends and Family:     Attends [employer] Services:     Active Member of Clubs or Organizations:     Attends Club or Organization Meetings:     Marital Status:    Intimate Partner Violence:     Fear of Current or Ex-Partner:     Emotionally Abused:     Physically Abused:     Sexually Abused:        Current Outpatient Medications   Medication Sig Dispense Refill    acetaminophen (Tylenol Extra Strength) 500 mg tablet Take  by mouth every six (6) hours as needed for Pain.  atorvastatin (LIPITOR) 40 mg tablet Take 1 Tab by mouth.  aspirin (ASPIRIN) 325 mg tablet Take 1 Tab by mouth two (2) times a day. 60 Tab 0    naproxen (NAPROSYN) 500 mg tablet Take 1 Tab by mouth two (2) times daily (with meals). (Patient not taking: Reported on 8/11/2021) 60 Tab 2    gabapentin (NEURONTIN) 300 mg capsule Take 1 Cap by mouth three (3) times daily. Max Daily Amount: 900 mg. (Patient not taking: Reported on 8/11/2021) 270 Cap 1    gabapentin (NEURONTIN) 300 mg capsule Take 300 mg by mouth three (3) times daily. (Patient not taking: Reported on 8/11/2021)      HYDROcodone-acetaminophen (NORCO) 5-325 mg per tablet Take 1 Tab by mouth every six (6) hours as needed for Pain. Max Daily Amount: 4 Tabs. (Patient not taking: Reported on 8/11/2021) 28 Tab 0    meloxicam (MOBIC) 15 mg tablet Take 1 Tab by mouth daily. (Patient not taking: Reported on 8/11/2021) 90 Tab 0    ibuprofen (MOTRIN) 200 mg tablet Take 400 mg by mouth every six (6) hours as needed for Pain. (Patient not taking: Reported on 8/11/2021)         No Known Allergies       PHYSICAL EXAMINATION    Visit Vitals  BP (!) 141/74 (BP 1 Location: Left arm, BP Patient Position: Sitting)   Pulse 60   Temp (!) 96.2 °F (35.7 °C) (Temporal)   Resp 16   Ht 5' 9\" (1.753 m)   Wt 183 lb 6.4 oz (83.2 kg)   SpO2 98%   BMI 27.08 kg/m²       CONSTITUTIONAL: NAD, A&O x 3  SENSATION: Intact to light touch throughout  RANGE OF MOTION: The patient has full passive range of motion in all four extremities. MOTOR:  Straight Leg Raise: Negative, bilateral                 Hip Flex Knee Ext Knee Flex Ankle DF GTE Ankle PF Tone   Right +4/5 +4/5 +4/5 +4/5 +4/5 +4/5 +4/5   Left +4/5 +4/5 +4/5 +4/5 +4/5 +4/5 +4/5       ASSESSMENT   Diagnoses and all orders for this visit:    1.  Lumbar pain  -     gabapentin (Neurontin) 300 mg capsule; Take 1 Capsule by mouth three (3) times daily. Max Daily Amount: 900 mg.    2. Lumbosacral spondylosis without myelopathy  -     gabapentin (Neurontin) 300 mg capsule; Take 1 Capsule by mouth three (3) times daily. Max Daily Amount: 900 mg.    3. Other intervertebral disc degeneration, lumbar region  -     gabapentin (Neurontin) 300 mg capsule; Take 1 Capsule by mouth three (3) times daily. Max Daily Amount: 900 mg.    4. Primary osteoarthritis of left knee  -     gabapentin (Neurontin) 300 mg capsule; Take 1 Capsule by mouth three (3) times daily. Max Daily Amount: 900 mg. IMPRESSION AND PLAN:  Patient returns to the office today with c/o gradual progression of low back pain radiating into the LLE in a L5 distribution to the ankle. Multiple treatment options were discussed. I will restart him on Neurontin 300 mg TID. Patient advised to call the office if intolerant to medication. Patient is neurologically intact. I will see the patient back in 1 month's time or earlier if needed. Written by Ivonne Kaufman, as dictated by Minda Lloyd MD  I examined the patient, reviewed and agree with the note.

## 2021-08-11 ENCOUNTER — OFFICE VISIT (OUTPATIENT)
Dept: ORTHOPEDIC SURGERY | Age: 66
End: 2021-08-11
Payer: MEDICARE

## 2021-08-11 VITALS
BODY MASS INDEX: 27.16 KG/M2 | TEMPERATURE: 96.2 F | RESPIRATION RATE: 16 BRPM | SYSTOLIC BLOOD PRESSURE: 141 MMHG | OXYGEN SATURATION: 98 % | DIASTOLIC BLOOD PRESSURE: 74 MMHG | WEIGHT: 183.4 LBS | HEIGHT: 69 IN | HEART RATE: 60 BPM

## 2021-08-11 DIAGNOSIS — M54.50 LUMBAR PAIN: Primary | ICD-10-CM

## 2021-08-11 DIAGNOSIS — M17.12 PRIMARY OSTEOARTHRITIS OF LEFT KNEE: ICD-10-CM

## 2021-08-11 DIAGNOSIS — M51.36 OTHER INTERVERTEBRAL DISC DEGENERATION, LUMBAR REGION: ICD-10-CM

## 2021-08-11 DIAGNOSIS — M47.817 LUMBOSACRAL SPONDYLOSIS WITHOUT MYELOPATHY: ICD-10-CM

## 2021-08-11 PROCEDURE — 3017F COLORECTAL CA SCREEN DOC REV: CPT | Performed by: PHYSICAL MEDICINE & REHABILITATION

## 2021-08-11 PROCEDURE — G8427 DOCREV CUR MEDS BY ELIG CLIN: HCPCS | Performed by: PHYSICAL MEDICINE & REHABILITATION

## 2021-08-11 PROCEDURE — G8432 DEP SCR NOT DOC, RNG: HCPCS | Performed by: PHYSICAL MEDICINE & REHABILITATION

## 2021-08-11 PROCEDURE — 99213 OFFICE O/P EST LOW 20 MIN: CPT | Performed by: PHYSICAL MEDICINE & REHABILITATION

## 2021-08-11 PROCEDURE — G8419 CALC BMI OUT NRM PARAM NOF/U: HCPCS | Performed by: PHYSICAL MEDICINE & REHABILITATION

## 2021-08-11 RX ORDER — ACETAMINOPHEN 500 MG
TABLET ORAL
COMMUNITY

## 2021-08-11 RX ORDER — GABAPENTIN 300 MG/1
300 CAPSULE ORAL 3 TIMES DAILY
Qty: 90 CAPSULE | Refills: 1 | Status: SHIPPED | OUTPATIENT
Start: 2021-08-11

## 2021-08-11 NOTE — LETTER
8/11/2021    Patient: Annabel Lambert. YOB: 1955   Date of Visit: 8/11/2021     Eliu Brand MD  [address] [place] [id number]  Via Fax: [phone]    Dear Eliu Brand MD,      Thank you for referring Mr. Shiv Fried to [address] for evaluation. My notes for this consultation are attached. If you have questions, please do not hesitate to call me. I look forward to following your patient along with you.       Sincerely,    Dona Baig MD

## 2021-11-08 NOTE — PROGRESS NOTES
[facility]  [address], [address]  Phone: [phone]  Fax: [phone]        PROGRESS NOTE      HISTORY OF PRESENT ILLNESS:  The patient is a 60 y.o. male and was seen today for follow up of gradual progression of low back pain radiating into the LLE in a L5 distribution to the ankle. Previously seen for left hip pain extending circumferentially into the LLE to the knee since January 2018. Initially had c/o low back pain extending in roughly an L5 distribution into the bilateral lower extremities. He endorses low back pain first thing in the morning. He is followed by Prosper Sher. Efrem MEIER for bilateral total hip replacement. Pt reports h/o three hip replacements, LLE x 2 and RLE x 1. Note from Dr. Eliza Jessica dated 3/14/16 indicates patient needs a new left hip replacement. Patient states he plans to have left total hip replacement revision done after July. Note from Beth Larios dated 10/20/2016 reveals pt had follow up revision of left total hip replacement on 8/3/2016. Pt reports that since the hip replacement his pain has improved. Patient is prescribed Norco by Dr. Eliza Jessica. Pt states he stopped his Neurontin after his hip surgery and is doing well off the medication. He has completed PT. Note from JR Owens dated 1/15/18 reviewed. His note indicated patient was seen with c/o left hip pain. At that time he underwent a left trochanteric injection without benefit. Patient reports he will f/u with RENEA Larios in 3 months. Note from JR Efrem Thomas NP dated 5/24/19 indicating patient was seen status post right hip replacement. He has had a left hip replacement revision. He does have a little trochanteric bursitis. Performed cortisone injection for left hip that day. Treated with Hay Mcleod from RENEA Celestin dated 8/30/19 indicating patient was seen with c/o left knee pain knee that was giving away.  Indicated he had patellofemoral arthritis of the knee. Injected the left hip at that time. Indicated that if pt continue to have mechanical symptomatology, we may need to move forward with an MRI of the knee. Note from Edgardo, [address] dated 7/16/2021indicating patient was seen with c/o low back pain. His bilateral hips are doing well. Pt has h/o L2 compression fracture. Pain radiating into the LLE. At this point move forward with trochanteric injections bilaterally. Surgical invention was discussed regarding heterotopic ossification of the left hip and was elected against. An MRI of the lumbar spine was ordered. L spine MRI dated 7/26/2021 films independently reviewed. Per report,Interval worsening of mild to moderate multilevel degenerative changes. Mild central canal stenosis L1-2 through L2-3. Multilevel mild and moderate degrees foraminal stenosis as discussed. Stable morphology of L2 vertebral body where there is most likely underlying vertebral osseous hemangioma with mild compression deformity and expansion of the vertebral body. Interval development of asymmetric atrophy of the left psoas and iliacus muscles. At his last clinic appointment,  I restarted him on Neurontin 300 mg TID. Patient was advised to call the office if intolerant to medication.       The patient returns today with left knee pain radiating into the ankle. He rates his pain 4-10/10, previously 7/10. He is not describing radicular pain complaints form his lower back at this time as he states his back pain is doing well. His pain localized to his left knee x 2020. States his knee pain is exacerbated with transitioning from sit to stand. Pt was last seen on 8/11/2021 and scheduled to f/u in 1 months time failed to do so secondary to financial issue. Subsequently the pt ran out of Neurontin 300 mg TID. He had tolerated it well with good relief. Pt denies hx of knee surgery, he has not followed up with Edgardo MEIER. He is scheduled to f/u with him next month on 12/9/2021. Pt denies change in bowel or bladder habits.  reviewed. There is no height or weight on file to calculate BMI. PCP: Bruno Fuller MD      Past Medical History:   Diagnosis Date    Arthritis     Balance problems     High cholesterol     Low back pain     Migraine     Right hip pain     Sleep apnea     does not use cpap        Social History     Socioeconomic History    Marital status:      Spouse name: Not on file    Number of children: Not on file    Years of education: Not on file    Highest education level: Not on file   Occupational History    Not on file   Tobacco Use    Smoking status: Never Smoker    Smokeless tobacco: Never Used   Substance and Sexual Activity    Alcohol use: Yes     Alcohol/week: 6.0 standard drinks     Types: 6 Cans of beer per week     Comment: week    Drug use: Yes     Types: Marijuana     Comment: last 7/19/2015    Sexual activity: Not on file   Other Topics Concern    Not on file   Social History Narrative    Not on file     Social Determinants of Health     Financial Resource Strain:     Difficulty of Paying Living Expenses: Not on file   Food Insecurity:     Worried About Running Out of Food in the Last Year: Not on file    Zana of Food in the Last Year: Not on file   Transportation Needs:     Lack of Transportation (Medical): Not on file    Lack of Transportation (Non-Medical):  Not on file   Physical Activity:     Days of Exercise per Week: Not on file    Minutes of Exercise per Session: Not on file   Stress:     Feeling of Stress : Not on file   Social Connections:     Frequency of Communication with Friends and Family: Not on file    Frequency of Social Gatherings with Friends and Family: Not on file    Attends Christian Services: Not on file    Active Member of Clubs or Organizations: Not on file    Attends Club or Organization Meetings: Not on file    Marital Status: Not on file   Intimate Partner Violence:     Fear of Current or Ex-Partner: Not on file   Peri Del Rosario Emotionally Abused: Not on file    Physically Abused: Not on file    Sexually Abused: Not on file   Housing Stability:     Unable to Pay for Housing in the Last Year: Not on file    Number of Places Lived in the Last Year: Not on file    Unstable Housing in the Last Year: Not on file       Current Outpatient Medications   Medication Sig Dispense Refill    acetaminophen (Tylenol Extra Strength) 500 mg tablet Take  by mouth every six (6) hours as needed for Pain.  gabapentin (Neurontin) 300 mg capsule Take 1 Capsule by mouth three (3) times daily. Max Daily Amount: 900 mg. 90 Capsule 1    naproxen (NAPROSYN) 500 mg tablet Take 1 Tab by mouth two (2) times daily (with meals). (Patient not taking: Reported on 8/11/2021) 60 Tab 2    gabapentin (NEURONTIN) 300 mg capsule Take 1 Cap by mouth three (3) times daily. Max Daily Amount: 900 mg. (Patient not taking: Reported on 8/11/2021) 270 Cap 1    gabapentin (NEURONTIN) 300 mg capsule Take 300 mg by mouth three (3) times daily. (Patient not taking: Reported on 8/11/2021)      HYDROcodone-acetaminophen (NORCO) 5-325 mg per tablet Take 1 Tab by mouth every six (6) hours as needed for Pain. Max Daily Amount: 4 Tabs. (Patient not taking: Reported on 8/11/2021) 28 Tab 0    meloxicam (MOBIC) 15 mg tablet Take 1 Tab by mouth daily. (Patient not taking: Reported on 8/11/2021) 90 Tab 0    ibuprofen (MOTRIN) 200 mg tablet Take 400 mg by mouth every six (6) hours as needed for Pain. (Patient not taking: Reported on 8/11/2021)      atorvastatin (LIPITOR) 40 mg tablet Take 1 Tab by mouth.  aspirin (ASPIRIN) 325 mg tablet Take 1 Tab by mouth two (2) times a day. 60 Tab 0       No Known Allergies       PHYSICAL EXAMINATION    There were no vitals taken for this visit. CONSTITUTIONAL: NAD, A&O x 3  SENSATION: Intact to light touch throughout  RANGE OF MOTION: The patient has full passive range of motion in all four extremities.   MOTOR:  Straight Leg Raise: Negative, bilateral     No increase in pain with direct palpation about the medial or lateral jointline of the left knee             Hip Flex Knee Ext Knee Flex Ankle DF GTE Ankle PF Tone   Right +4/5 +4/5 +4/5 +4/5 +4/5 +4/5 +4/5   Left +4/5 +4/5 +4/5 +4/5 +4/5 +4/5 +4/5       ASSESSMENT   Diagnoses and all orders for this visit:    1. Lumbar pain    2. Lumbosacral spondylosis without myelopathy    3. Other intervertebral disc degeneration, lumbar region    4. Primary osteoarthritis of left knee    5. Left knee pain, unspecified chronicity      IMPRESSION AND PLAN:  Patient returns to the office today with c/o left knee pain radiating into the ankle. Multiple treatment options were discussed. My sense his sxs are related to knee pathology rather than spinal. Note from Blayne Pritchett dated 8/12/2021 indicated he recommended a MRI for his left knee. MRI has not happened. I will order a left sided knee MRI. He should f/u with Blayne Pritchett following MRI. Patient is neurologically intact. I will see the patient back following appointment with RENEA Sinha or earlier if needed. Written by Nery Valenzuela, as dictated by Violet Ludwig MD  I examined the patient, reviewed and agree with the note.

## 2021-11-10 ENCOUNTER — OFFICE VISIT (OUTPATIENT)
Dept: ORTHOPEDIC SURGERY | Age: 66
End: 2021-11-10
Payer: MEDICARE

## 2021-11-10 VITALS
OXYGEN SATURATION: 98 % | WEIGHT: 179.8 LBS | RESPIRATION RATE: 16 BRPM | TEMPERATURE: 98.7 F | HEIGHT: 69 IN | HEART RATE: 57 BPM | BODY MASS INDEX: 26.63 KG/M2

## 2021-11-10 DIAGNOSIS — M47.817 LUMBOSACRAL SPONDYLOSIS WITHOUT MYELOPATHY: ICD-10-CM

## 2021-11-10 DIAGNOSIS — M25.562 LEFT KNEE PAIN, UNSPECIFIED CHRONICITY: ICD-10-CM

## 2021-11-10 DIAGNOSIS — M54.50 LUMBAR PAIN: Primary | ICD-10-CM

## 2021-11-10 DIAGNOSIS — M17.12 PRIMARY OSTEOARTHRITIS OF LEFT KNEE: ICD-10-CM

## 2021-11-10 DIAGNOSIS — M51.36 OTHER INTERVERTEBRAL DISC DEGENERATION, LUMBAR REGION: ICD-10-CM

## 2021-11-10 PROCEDURE — G8427 DOCREV CUR MEDS BY ELIG CLIN: HCPCS | Performed by: PHYSICAL MEDICINE & REHABILITATION

## 2021-11-10 PROCEDURE — G8432 DEP SCR NOT DOC, RNG: HCPCS | Performed by: PHYSICAL MEDICINE & REHABILITATION

## 2021-11-10 PROCEDURE — 3017F COLORECTAL CA SCREEN DOC REV: CPT | Performed by: PHYSICAL MEDICINE & REHABILITATION

## 2021-11-10 PROCEDURE — G8419 CALC BMI OUT NRM PARAM NOF/U: HCPCS | Performed by: PHYSICAL MEDICINE & REHABILITATION

## 2021-11-10 PROCEDURE — 99213 OFFICE O/P EST LOW 20 MIN: CPT | Performed by: PHYSICAL MEDICINE & REHABILITATION

## 2021-11-10 RX ORDER — DICLOFENAC SODIUM 10 MG/G
GEL TOPICAL
COMMUNITY
Start: 2021-09-15

## 2021-11-10 NOTE — Clinical Note
11/10/2021    Patient: Lolita Jalloh. YOB: 1955   Date of Visit: 11/10/2021     Jae Dorsey MD  Via     Dear Jae Dorsey MD,      Thank you for referring Mr. Laura Melo to [address] for evaluation. My notes for this consultation are attached. If you have questions, please do not hesitate to call me. I look forward to following your patient along with you.       Sincerely,    Nehemias Hilton MD

## 2021-11-17 DIAGNOSIS — M25.562 LEFT KNEE PAIN, UNSPECIFIED CHRONICITY: ICD-10-CM

## 2021-11-17 DIAGNOSIS — M17.12 PRIMARY OSTEOARTHRITIS OF LEFT KNEE: ICD-10-CM

## 2021-11-30 ENCOUNTER — TELEPHONE (OUTPATIENT)
Dept: ORTHOPEDIC SURGERY | Age: 66
End: 2021-11-30

## 2021-11-30 DIAGNOSIS — M25.562 LEFT KNEE PAIN, UNSPECIFIED CHRONICITY: Primary | ICD-10-CM

## 2021-11-30 NOTE — TELEPHONE ENCOUNTER
Patient informed of denial and MRI appointment cancelled. Suggested JR complete the requirements on 12/09/21 and re-order if desired. Patient expressed his understanding.

## 2021-11-30 NOTE — TELEPHONE ENCOUNTER
I have received a message from a nurse reviewer with [facility] in regards to MRI of the Left Knee without contrast. At this time, she is not able to approve our request. She is requesting Lily test results and current x-ray findings to be faxed to [phone]. Neither are available in the office note, chart or Care Everywhere. We can allow the denial to stand or complete a peer to peer review by calling [phone], Reference# [id number], Patient ID# [id number]. No deadline was given, however, the MRI is scheduled for 12/06/21. Thank you.

## 2022-03-18 ENCOUNTER — OFFICE VISIT (OUTPATIENT)
Dept: ORTHOPEDIC SURGERY | Age: 67
End: 2022-03-18
Payer: MEDICARE

## 2022-03-18 VITALS
BODY MASS INDEX: 26.36 KG/M2 | WEIGHT: 178 LBS | OXYGEN SATURATION: 97 % | TEMPERATURE: 98.6 F | HEART RATE: 69 BPM | HEIGHT: 69 IN

## 2022-03-18 DIAGNOSIS — M17.12 PRIMARY OSTEOARTHRITIS OF LEFT KNEE: ICD-10-CM

## 2022-03-18 DIAGNOSIS — M25.562 CHRONIC PAIN OF LEFT KNEE: Primary | ICD-10-CM

## 2022-03-18 DIAGNOSIS — G89.29 CHRONIC PAIN OF LEFT KNEE: Primary | ICD-10-CM

## 2022-03-18 DIAGNOSIS — M23.92 LOCKING OF LEFT KNEE: ICD-10-CM

## 2022-03-18 DIAGNOSIS — M25.362 KNEE GIVES WAY, LEFT: ICD-10-CM

## 2022-03-18 PROCEDURE — 73564 X-RAY EXAM KNEE 4 OR MORE: CPT | Performed by: ORTHOPAEDIC SURGERY

## 2022-03-18 PROCEDURE — G8536 NO DOC ELDER MAL SCRN: HCPCS | Performed by: ORTHOPAEDIC SURGERY

## 2022-03-18 PROCEDURE — G8427 DOCREV CUR MEDS BY ELIG CLIN: HCPCS | Performed by: ORTHOPAEDIC SURGERY

## 2022-03-18 PROCEDURE — 1101F PT FALLS ASSESS-DOCD LE1/YR: CPT | Performed by: ORTHOPAEDIC SURGERY

## 2022-03-18 PROCEDURE — 3017F COLORECTAL CA SCREEN DOC REV: CPT | Performed by: ORTHOPAEDIC SURGERY

## 2022-03-18 PROCEDURE — G8419 CALC BMI OUT NRM PARAM NOF/U: HCPCS | Performed by: ORTHOPAEDIC SURGERY

## 2022-03-18 PROCEDURE — 99214 OFFICE O/P EST MOD 30 MIN: CPT | Performed by: ORTHOPAEDIC SURGERY

## 2022-03-18 PROCEDURE — G8432 DEP SCR NOT DOC, RNG: HCPCS | Performed by: ORTHOPAEDIC SURGERY

## 2022-03-18 RX ORDER — DICLOFENAC SODIUM 75 MG/1
TABLET, DELAYED RELEASE ORAL
COMMUNITY

## 2022-03-18 NOTE — PROGRESS NOTES
Patient: Josh Reyes. MRN: [id number]       SSN: [id number]  YOB: 1955        AGE: 73 y.o. SEX: male  Body mass index is 26.29 kg/m². PCP: Linda Harris MD  03/18/22    Viola Hu returns reevaluation of left knee pain he is seeing Dr. Luisa Harry he has had hip replacements and also subsequent bursitis and everything is doing well from his joints point of view his main complaint is with his left knee he is tried anti-inflammatories he had an injection before and he gives a history of catching locking giving way not too much in the way of night pain mild discomfort with kneeling but not severely so and not a lot of swelling and it he does notice some clicking from occasion as well.   The pain is mild to moderate its episodic when it catches or locks is more painful on a daily basis is about a 1 or 2 out of 10 but will catch and lock several times a day depending on his activities the examination of today really pleasant gentleman in no acute distress he is a very pleasant disposition the hips rotate nicely are noncontributory the knee itself he has medial and lateral joint line tenderness slight effusion he is got Lily's test positive actually medially and laterally 1+ ACL quads are intact is a -2 to about 110 degrees bending half calf nontender Sueanne Austin' sign is negative he keeps himself in good physical condition    No evidence for infection or DVT    X-rays today 3/18/2022 4 views left knee confirms just mild to moderate arthritis but not severely so this gentleman has a symptomatic meniscal tears actually medially and laterally we have already tried anti-inflammatories injections is tried home exercise program MRI is indicated I think he is going to require an arthroscopy there was a discussion regarding surgery and it was decided that it is recommended if the MRI is positive which I suspect will be we will see him back to review the MRI    REVIEW OF SYSTEMS:      CON: negative  EYE: negative   ENT: negative  RESP: negative  GI:    negative   :  negative  MSK: Positive  A twelve point review of systems was completed, positives noted and all other systems were reviewed and are negative          Past Medical History:   Diagnosis Date    Arthritis     Balance problems     High cholesterol     Low back pain     Migraine     Right hip pain     Sleep apnea     does not use cpap       Family History   Problem Relation Age of Onset    Diabetes Mother     Cancer Mother     Cancer Sister     Diabetes Sister     Hypertension Other     Heart Disease Other     OSTEOARTHRITIS Other        Current Outpatient Medications   Medication Sig Dispense Refill    diclofenac EC (VOLTAREN) 75 mg EC tablet Take  by mouth.  diclofenac (VOLTAREN) 1 % gel       acetaminophen (Tylenol Extra Strength) 500 mg tablet Take  by mouth every six (6) hours as needed for Pain.  atorvastatin (LIPITOR) 40 mg tablet Take 1 Tab by mouth.  aspirin (ASPIRIN) 325 mg tablet Take 1 Tab by mouth two (2) times a day. 60 Tab 0    gabapentin (Neurontin) 300 mg capsule Take 1 Capsule by mouth three (3) times daily. Max Daily Amount: 900 mg. (Patient not taking: Reported on 11/10/2021) 90 Capsule 1    naproxen (NAPROSYN) 500 mg tablet Take 1 Tab by mouth two (2) times daily (with meals). (Patient not taking: Reported on 8/11/2021) 60 Tab 2    gabapentin (NEURONTIN) 300 mg capsule Take 1 Cap by mouth three (3) times daily. Max Daily Amount: 900 mg. (Patient not taking: Reported on 8/11/2021) 270 Cap 1    gabapentin (NEURONTIN) 300 mg capsule Take 300 mg by mouth three (3) times daily. (Patient not taking: Reported on 8/11/2021)      HYDROcodone-acetaminophen (NORCO) 5-325 mg per tablet Take 1 Tab by mouth every six (6) hours as needed for Pain. Max Daily Amount: 4 Tabs.  (Patient not taking: Reported on 8/11/2021) 28 Tab 0    meloxicam (MOBIC) 15 mg tablet Take 1 Tab by mouth daily. (Patient not taking: Reported on 8/11/2021) 90 Tab 0    ibuprofen (MOTRIN) 200 mg tablet Take 400 mg by mouth every six (6) hours as needed for Pain. (Patient not taking: Reported on 8/11/2021)         No Known Allergies    Past Surgical History:   Procedure Laterality Date    HX HEENT      laser eye surgery left eye, prosthetic right eye    HX HIP REPLACEMENT Bilateral     HX ORTHOPAEDIC      right hip replacement, left hip replacement    HX OTHER SURGICAL      Lumbar Compression    HX REFRACTIVE SURGERY         Social History     Socioeconomic History    Marital status:      Spouse name: Not on file    Number of children: Not on file    Years of education: Not on file    Highest education level: Not on file   Occupational History    Not on file   Tobacco Use    Smoking status: Never Smoker    Smokeless tobacco: Never Used   Substance and Sexual Activity    Alcohol use: Yes     Alcohol/week: 6.0 standard drinks     Types: 6 Cans of beer per week     Comment: week    Drug use: Yes     Types: Marijuana     Comment: last 7/19/2015    Sexual activity: Not on file   Other Topics Concern    Not on file   Social History Narrative    Not on file     Social Determinants of Health     Financial Resource Strain:     Difficulty of Paying Living Expenses: Not on file   Food Insecurity:     Worried About Running Out of Food in the Last Year: Not on file    Zana of Food in the Last Year: Not on file   Transportation Needs:     Lack of Transportation (Medical): Not on file    Lack of Transportation (Non-Medical):  Not on file   Physical Activity:     Days of Exercise per Week: Not on file    Minutes of Exercise per Session: Not on file   Stress:     Feeling of Stress : Not on file   Social Connections:     Frequency of Communication with Friends and Family: Not on file    Frequency of Social Gatherings with Friends and Family: Not on file    Attends [employer] Services: Not on file   Ryan Active Member of Clubs or Organizations: Not on file    Attends Club or Organization Meetings: Not on file    Marital Status: Not on file   Intimate Partner Violence:     Fear of Current or Ex-Partner: Not on file    Emotionally Abused: Not on file    Physically Abused: Not on file    Sexually Abused: Not on file   Housing Stability:     Unable to Pay for Housing in the Last Year: Not on file    Number of [place] in the Last Year: Not on file    Unstable Housing in the Last Year: Not on file       Visit Vitals  Pulse 69   Temp 98.6 °F (37 °C)   Ht 5' 9\" (1.753 m)   Wt 178 lb (80.7 kg)   SpO2 97%   BMI 26.29 kg/m²         PHYSICAL EXAMINATION:  GENERAL: Alert and oriented x3, in no acute distress, well-developed, well-nourished, afebrile. HEART: No JVD. EYES: No scleral icterus   NECK: No significant lymphadenopathy   LUNGS: No respiratory compromise or indrawing  ABDOMEN: Soft, non-tender, non-distended. Note: This note was completed using voice recognition software. Any typographical/name errors or mistakes are unintentional.    Electronically signed by:  Nathaly Hui MD

## 2022-04-05 ENCOUNTER — HOSPITAL ENCOUNTER (OUTPATIENT)
Age: 67
Discharge: HOME | End: 2022-04-05
Attending: ORTHOPAEDIC SURGERY
Payer: MEDICARE

## 2022-04-05 DIAGNOSIS — M25.362 KNEE GIVES WAY, LEFT: ICD-10-CM

## 2022-04-05 DIAGNOSIS — M23.92 LOCKING OF LEFT KNEE: ICD-10-CM

## 2022-04-05 DIAGNOSIS — M25.562 CHRONIC PAIN OF LEFT KNEE: ICD-10-CM

## 2022-04-05 DIAGNOSIS — G89.29 CHRONIC PAIN OF LEFT KNEE: ICD-10-CM

## 2022-04-05 PROCEDURE — 73721 MRI JNT OF LWR EXTRE W/O DYE: CPT

## 2022-04-22 ENCOUNTER — OFFICE VISIT (OUTPATIENT)
Dept: ORTHOPEDIC SURGERY | Age: 67
End: 2022-04-22
Payer: MEDICARE

## 2022-04-22 VITALS — TEMPERATURE: 97.3 F | WEIGHT: 175 LBS | HEART RATE: 69 BPM | BODY MASS INDEX: 25.84 KG/M2 | OXYGEN SATURATION: 98 %

## 2022-04-22 DIAGNOSIS — M25.562 CHRONIC PAIN OF LEFT KNEE: Primary | ICD-10-CM

## 2022-04-22 DIAGNOSIS — M17.10 PATELLOFEMORAL ARTHRITIS: ICD-10-CM

## 2022-04-22 DIAGNOSIS — M17.12 PRIMARY OSTEOARTHRITIS OF LEFT KNEE: ICD-10-CM

## 2022-04-22 DIAGNOSIS — G89.29 CHRONIC PAIN OF LEFT KNEE: Primary | ICD-10-CM

## 2022-04-22 PROCEDURE — 3017F COLORECTAL CA SCREEN DOC REV: CPT | Performed by: ORTHOPAEDIC SURGERY

## 2022-04-22 PROCEDURE — G8432 DEP SCR NOT DOC, RNG: HCPCS | Performed by: ORTHOPAEDIC SURGERY

## 2022-04-22 PROCEDURE — 99214 OFFICE O/P EST MOD 30 MIN: CPT | Performed by: ORTHOPAEDIC SURGERY

## 2022-04-22 PROCEDURE — 20610 DRAIN/INJ JOINT/BURSA W/O US: CPT | Performed by: ORTHOPAEDIC SURGERY

## 2022-04-22 PROCEDURE — G8419 CALC BMI OUT NRM PARAM NOF/U: HCPCS | Performed by: ORTHOPAEDIC SURGERY

## 2022-04-22 PROCEDURE — G8427 DOCREV CUR MEDS BY ELIG CLIN: HCPCS | Performed by: ORTHOPAEDIC SURGERY

## 2022-04-22 PROCEDURE — G8536 NO DOC ELDER MAL SCRN: HCPCS | Performed by: ORTHOPAEDIC SURGERY

## 2022-04-22 PROCEDURE — 1101F PT FALLS ASSESS-DOCD LE1/YR: CPT | Performed by: ORTHOPAEDIC SURGERY

## 2022-04-22 RX ORDER — BETAMETHASONE SODIUM PHOSPHATE AND BETAMETHASONE ACETATE 3; 3 MG/ML; MG/ML
6 INJECTION, SUSPENSION INTRA-ARTICULAR; INTRALESIONAL; INTRAMUSCULAR; SOFT TISSUE ONCE
Status: COMPLETED | OUTPATIENT
Start: 2022-04-22 | End: 2022-04-22

## 2022-04-22 RX ADMIN — BETAMETHASONE SODIUM PHOSPHATE AND BETAMETHASONE ACETATE 6 MG: 3; 3 INJECTION, SUSPENSION INTRA-ARTICULAR; INTRALESIONAL; INTRAMUSCULAR; SOFT TISSUE at 10:10

## 2022-04-22 NOTE — PROGRESS NOTES
Patient: Christel Paige. MRN: [id number]       SSN: [id number]  YOB: 1955        AGE: 73 y.o. SEX: male  Body mass index is 25.84 kg/m². PCP: Eligio Hamilton MD  04/22/22    Marily Root presents with left-sided knee pain especially getting up from a chair takes him a minute to get going kneeling stairs are occasional mechanical symptoms as well he is very active with a grandchild and playing drums again back in [place]. The pain is moderate nonradicular aching in nature has occasional night pain as well and the examination today he is actually walking reasonably well he does have movie theater sign when he first arises from a chair the hips rotate nicely both feet warm and well-perfused no cyanosis or clubbing he has good body hygiene.     The previous x-rays confirmed moderately advanced patellofemoral arthritis    I did review his MRI with him negative meniscal tear significant arthritis involving the patellofemoral articulation    There was a discussion regarding surgery was decided is not currently recommended we should pursue nonoperative measures therefore the left knee was injected with Celestone and well-tolerated as per protocol I would recommend also viscosupplementation for him and will plan that in about a month or so its been a pleasure to share in his care    REVIEW OF SYSTEMS:      CON: negative  EYE: negative   ENT: negative  RESP: negative  GI:    negative   :  negative  MSK: Positive  A twelve point review of systems was completed, positives noted and all other systems were reviewed and are negative          Past Medical History:   Diagnosis Date    Arthritis     Balance problems     High cholesterol     Low back pain     Migraine     Right hip pain     Sleep apnea     does not use cpap       Family History   Problem Relation Age of Onset    Diabetes Mother     Cancer Mother     Cancer Sister     Diabetes Sister     Hypertension Other     Heart Disease Other     OSTEOARTHRITIS Other        Current Outpatient Medications   Medication Sig Dispense Refill    diclofenac EC (VOLTAREN) 75 mg EC tablet Take  by mouth.  diclofenac (VOLTAREN) 1 % gel       acetaminophen (Tylenol Extra Strength) 500 mg tablet Take  by mouth every six (6) hours as needed for Pain.  atorvastatin (LIPITOR) 40 mg tablet Take 1 Tab by mouth.  aspirin (ASPIRIN) 325 mg tablet Take 1 Tab by mouth two (2) times a day. 60 Tab 0    gabapentin (Neurontin) 300 mg capsule Take 1 Capsule by mouth three (3) times daily. Max Daily Amount: 900 mg. (Patient not taking: Reported on 11/10/2021) 90 Capsule 1    naproxen (NAPROSYN) 500 mg tablet Take 1 Tab by mouth two (2) times daily (with meals). (Patient not taking: Reported on 8/11/2021) 60 Tab 2    gabapentin (NEURONTIN) 300 mg capsule Take 1 Cap by mouth three (3) times daily. Max Daily Amount: 900 mg. (Patient not taking: Reported on 8/11/2021) 270 Cap 1    gabapentin (NEURONTIN) 300 mg capsule Take 300 mg by mouth three (3) times daily. (Patient not taking: Reported on 8/11/2021)      HYDROcodone-acetaminophen (NORCO) 5-325 mg per tablet Take 1 Tab by mouth every six (6) hours as needed for Pain. Max Daily Amount: 4 Tabs. (Patient not taking: Reported on 8/11/2021) 28 Tab 0    meloxicam (MOBIC) 15 mg tablet Take 1 Tab by mouth daily. (Patient not taking: Reported on 8/11/2021) 90 Tab 0    ibuprofen (MOTRIN) 200 mg tablet Take 400 mg by mouth every six (6) hours as needed for Pain.  (Patient not taking: Reported on 8/11/2021)       Current Facility-Administered Medications   Medication Dose Route Frequency Provider Last Rate Last Admin    betamethasone (CELESTONE) injection 6 mg  6 mg Intra artICUlar ONCE Yeny Merlos MD           No Known Allergies    Past Surgical History:   Procedure Laterality Date    HX HEENT      laser eye surgery left eye, prosthetic right eye    HX HIP REPLACEMENT Bilateral  HX ORTHOPAEDIC      right hip replacement, left hip replacement    HX OTHER SURGICAL      Lumbar Compression    HX REFRACTIVE SURGERY         Social History     Socioeconomic History    Marital status:      Spouse name: Not on file    Number of children: Not on file    Years of education: Not on file    Highest education level: Not on file   Occupational History    Not on file   Tobacco Use    Smoking status: Never Smoker    Smokeless tobacco: Never Used   Substance and Sexual Activity    Alcohol use: Yes     Alcohol/week: 6.0 standard drinks     Types: 6 Cans of beer per week     Comment: week    Drug use: Yes     Types: Marijuana     Comment: last 7/19/2015    Sexual activity: Not on file   Other Topics Concern    Not on file   Social History Narrative    Not on file     Social Determinants of Health     Financial Resource Strain:     Difficulty of Paying Living Expenses: Not on file   Food Insecurity:     Worried About Running Out of Food in the Last Year: Not on file    Zana of Food in the Last Year: Not on file   Transportation Needs:     Lack of Transportation (Medical): Not on file    Lack of Transportation (Non-Medical):  Not on file   Physical Activity:     Days of Exercise per Week: Not on file    Minutes of Exercise per Session: Not on file   Stress:     Feeling of Stress : Not on file   Social Connections:     Frequency of Communication with Friends and Family: Not on file    Frequency of Social Gatherings with Friends and Family: Not on file    Attends [employer] Services: Not on file    Active Member of Clubs or Organizations: Not on file    Attends Club or Organization Meetings: Not on file    Marital Status: Not on file   Intimate Partner Violence:     Fear of Current or Ex-Partner: Not on file    Emotionally Abused: Not on file    Physically Abused: Not on file    Sexually Abused: Not on file   Housing Stability:     Unable to Pay for Housing in the Last Year: Not on file    Number of Places Lived in the Last Year: Not on file    Unstable Housing in the Last Year: Not on file       Visit Vitals  Pulse 69   Temp 97.3 °F (36.3 °C) (Temporal)   Wt 79.4 kg (175 lb)   SpO2 98%   BMI 25.84 kg/m²         PHYSICAL EXAMINATION:  GENERAL: Alert and oriented x3, in no acute distress, well-developed, well-nourished, afebrile. HEART: No JVD. EYES: No scleral icterus   NECK: No significant lymphadenopathy   LUNGS: No respiratory compromise or indrawing  ABDOMEN: Soft, non-tender, non-distended. Note: This note was completed using voice recognition software. Any typographical/name errors or mistakes are unintentional.    Electronically signed by: Lowery Primrose, MD I, Lenis Blalock Clair Patty, M.D., have reviewed the history, physical, and have updated the allergic reactions for Jazzy Crenshaw. Mathieuene Latch TIME OUT performed immediately prior to the start of procedure:  Sohail GUIDRY M.D., have performed the following reviews on Jazzy Crenshaw. prior to the start of the procedure:    - Patient was identified by name and date of birth  - Agreement on procedure being performed was verified  - Risks and benefits explained to the patient  - Patient was positioned for comfort  - Consent was signed and verified  - Patient was advised regarding risks of bruising, bleeding, infection and pain    Time: 10:05 AM     Body Part: intra-articular injection of left knee    Medication and Dose: 1mL Celestone preparation, i.e. 6 mg, and 3 mL 1% lidocaine    Date of Procedure: 04/22/22    PROCEDURE PERFORMED BY : Dimas Garland M.D., [facility])    Provider Assisted by: Oni Tariq    Patient assisted by: self    Patient tolerated procedure well with no complications

## 2022-05-04 ENCOUNTER — DOCUMENTATION ONLY (OUTPATIENT)
Dept: ORTHOPEDIC SURGERY | Age: 67
End: 2022-05-04

## 2022-06-09 ENCOUNTER — OFFICE VISIT (OUTPATIENT)
Dept: ORTHOPEDIC SURGERY | Age: 67
End: 2022-06-09
Payer: MEDICARE

## 2022-06-09 VITALS — TEMPERATURE: 96.7 F | HEIGHT: 69 IN | WEIGHT: 175 LBS | BODY MASS INDEX: 25.92 KG/M2

## 2022-06-09 DIAGNOSIS — M17.12 PRIMARY OSTEOARTHRITIS OF LEFT KNEE: Primary | ICD-10-CM

## 2022-06-09 PROCEDURE — 20611 DRAIN/INJ JOINT/BURSA W/US: CPT | Performed by: PHYSICIAN ASSISTANT

## 2022-06-09 NOTE — PROGRESS NOTES
Patient: Adrian Henriquez MRN: [id number]       SSN: [id number]  YOB: 1955        AGE: 73 y.o. SEX: male  Body mass index is 25.84 kg/m². PCP: Dereje Malloy MD  06/09/22        Pt presents today for their 1st orthovisc  injection for Left knee . There has been no recent fevers/chills, systemic changes, or injuries to report. PE:  General A&Ox3, NAD  Exam of the knees reveals skin intact, no erythema, no ecchymosis, no signs for infection. No cyanosis, clubbing, or edema present distally. Neg calf tenderness, neg homans, no signs for DVT present. A: Left knee OA    P: The Left knee was injected 2ml orthovisc with US guided assistance without complications. Patient was instructed on post injection care. Chart reviewed for the following:  Sandro GUIDRY PA-C, have reviewed the History, Physical and updated the Allergic reactions for Adrian Gill.? TIME OUT performed immediately prior to start of procedure:  Sandro GUIDRY PA-C, have performed the following reviews on Adrian Gill. prior to the start of the procedure:  ????????  * Patient was identified by name and date of birth   * Agreement on procedure being performed was verified  * Risks and Benefits explained to the patient  * Procedure site verified and marked as necessary  * Patient was positioned for comfort  * Consent was signed and verified    Time: 3:12 PM    Body part: Left knee, intra-articular    Medication & Dose: 2ml orthovisc    Date of procedure: 6/9/22    Procedure performed by: Maryana Morales PA-C    Provider assisted by: none    Patient assisted by: self    How tolerated by patient: tolerated the procedure well with no complications    Post Procedural Pain Scale: 0    Comments:  [address] using a frequency of 10MHz with a 12L-RS transducer head was used to confirm needle placement.   Ultrasound images captured using Praneeth machine and scanned into patient's chart      Cassidy Hartman PA-C

## 2022-06-17 ENCOUNTER — OFFICE VISIT (OUTPATIENT)
Dept: ORTHOPEDIC SURGERY | Age: 67
End: 2022-06-17
Payer: MEDICARE

## 2022-06-17 VITALS — HEIGHT: 69 IN | WEIGHT: 171.6 LBS | TEMPERATURE: 97.5 F | BODY MASS INDEX: 25.42 KG/M2

## 2022-06-17 DIAGNOSIS — M17.12 PRIMARY OSTEOARTHRITIS OF LEFT KNEE: Primary | ICD-10-CM

## 2022-06-17 PROCEDURE — 20611 DRAIN/INJ JOINT/BURSA W/US: CPT | Performed by: PHYSICIAN ASSISTANT

## 2022-06-17 RX ORDER — LISINOPRIL 5 MG/1
TABLET ORAL
COMMUNITY
Start: 2022-06-16

## 2022-06-17 NOTE — PROGRESS NOTES
Patient: Nery Barajas. MRN: [id number]       SSN: [id number]  YOB: 1955        AGE: 73 y.o. SEX: male  Body mass index is 25.34 kg/m². PCP: Tressa Mo MD  06/17/22        Pt presents today for their 2nd orthovisc  injection for Left knee . There has been no recent fevers/chills, systemic changes, or injuries to report. PE:  General A&Ox3, NAD  Exam of the knees reveals skin intact, no erythema, no ecchymosis, no signs for infection. No cyanosis, clubbing, or edema present distally. Neg calf tenderness, neg homans, no signs for DVT present. A: Left knee OA    P: The Left knee was injected 2ml orthovisc with US guided assistance without complications. Patient was instructed on post injection care. Chart reviewed for the following:  Alpesh GUIDRY PA-C, have reviewed the History, Physical and updated the Allergic reactions for Nery Barajas.? TIME OUT performed immediately prior to start of procedure:  Alpesh GUIDRY PA-C, have performed the following reviews on Nery Barajas. prior to the start of the procedure:  ????????  * Patient was identified by name and date of birth   * Agreement on procedure being performed was verified  * Risks and Benefits explained to the patient  * Procedure site verified and marked as necessary  * Patient was positioned for comfort  * Consent was signed and verified    Time: 10:51 AM    Body part: Left knee, intra-articular    Medication & Dose: 2ml orthovisc    Date of procedure: 6/17/22    Procedure performed by: Rafia Leo PA-C    Provider assisted by: none    Patient assisted by: self    How tolerated by patient: tolerated the procedure well with no complications    Post Procedural Pain Scale: 2    Comments:  [address] using a frequency of 10MHz with a 12L-RS transducer head was used to confirm needle placement.   Ultrasound images captured using Praneeth machine and scanned into patient's chart      Yesika Garcias PA-C

## 2022-06-24 ENCOUNTER — OFFICE VISIT (OUTPATIENT)
Dept: ORTHOPEDIC SURGERY | Age: 67
End: 2022-06-24
Payer: MEDICARE

## 2022-06-24 VITALS — HEIGHT: 69 IN | WEIGHT: 175.6 LBS | TEMPERATURE: 98.2 F | BODY MASS INDEX: 26.01 KG/M2

## 2022-06-24 DIAGNOSIS — M17.12 PRIMARY OSTEOARTHRITIS OF LEFT KNEE: Primary | ICD-10-CM

## 2022-06-24 DIAGNOSIS — G89.29 CHRONIC PAIN OF LEFT KNEE: ICD-10-CM

## 2022-06-24 DIAGNOSIS — M25.562 CHRONIC PAIN OF LEFT KNEE: ICD-10-CM

## 2022-06-24 PROCEDURE — 20610 DRAIN/INJ JOINT/BURSA W/O US: CPT | Performed by: ORTHOPAEDIC SURGERY

## 2022-06-24 NOTE — PROGRESS NOTES
Patient: Yehuda Colvin. MRN: [id number]       SSN: [id number]  YOB: 1955        AGE: 73 y.o. SEX: male  Body mass index is 25.93 kg/m². PCP: Judit Crespo MD  06/24/22    Mr. Ruy So returns for reevaluation left-sided knee pain he is having Orthovisc very satisfied already he is here for number 3 injection I did inspect the knee no evidence for infection nicely healed portal of entry left knee injected as per protocol aftercare instructions return to see us next week for #4 been a pleasure to share in his care his hips are doing well    REVIEW OF SYSTEMS:      CON: negative  EYE: negative   ENT: negative  RESP: negative  GI:    negative   :  negative  MSK: Positive  A twelve point review of systems was completed, positives noted and all other systems were reviewed and are negative          Past Medical History:   Diagnosis Date    Arthritis     Balance problems     High cholesterol     Low back pain     Migraine     Right hip pain     Sleep apnea     does not use cpap       Family History   Problem Relation Age of Onset    Diabetes Mother     Cancer Mother     Cancer Sister     Diabetes Sister     Hypertension Other     Heart Disease Other     OSTEOARTHRITIS Other        Current Outpatient Medications   Medication Sig Dispense Refill    lisinopriL (PRINIVIL, ZESTRIL) 5 mg tablet       diclofenac EC (VOLTAREN) 75 mg EC tablet Take  by mouth.  diclofenac (VOLTAREN) 1 % gel       acetaminophen (Tylenol Extra Strength) 500 mg tablet Take  by mouth every six (6) hours as needed for Pain.  gabapentin (Neurontin) 300 mg capsule Take 1 Capsule by mouth three (3) times daily. Max Daily Amount: 900 mg. 90 Capsule 1    naproxen (NAPROSYN) 500 mg tablet Take 1 Tab by mouth two (2) times daily (with meals). 60 Tab 2    gabapentin (NEURONTIN) 300 mg capsule Take 1 Cap by mouth three (3) times daily.  Max Daily Amount: 900 mg. 270 Cap 1  gabapentin (NEURONTIN) 300 mg capsule Take 300 mg by mouth three (3) times daily.  HYDROcodone-acetaminophen (NORCO) 5-325 mg per tablet Take 1 Tab by mouth every six (6) hours as needed for Pain. Max Daily Amount: 4 Tabs. 28 Tab 0    meloxicam (MOBIC) 15 mg tablet Take 1 Tab by mouth daily. 90 Tab 0    ibuprofen (MOTRIN) 200 mg tablet Take 400 mg by mouth every six (6) hours as needed for Pain.  atorvastatin (LIPITOR) 40 mg tablet Take 1 Tab by mouth. (Patient not taking: Reported on 6/17/2022)      aspirin (ASPIRIN) 325 mg tablet Take 1 Tab by mouth two (2) times a day. 60 Tab 0     Current Facility-Administered Medications   Medication Dose Route Frequency Provider Last Rate Last Admin    sodium hyaluronate (viscosup) (ORTHOVISC) 30 mg/2 mL injection syrg 30 mg  30 mg Intra artICUlar ONCE Daisy Cm MD           No Known Allergies    Past Surgical History:   Procedure Laterality Date    HX HEENT      laser eye surgery left eye, prosthetic right eye    HX HIP REPLACEMENT Bilateral     HX ORTHOPAEDIC      right hip replacement, left hip replacement    HX OTHER SURGICAL      Lumbar Compression    HX REFRACTIVE SURGERY         Social History     Socioeconomic History    Marital status:      Spouse name: Not on file    Number of children: Not on file    Years of education: Not on file    Highest education level: Not on file   Occupational History    Not on file   Tobacco Use    Smoking status: Never Smoker    Smokeless tobacco: Never Used   Substance and Sexual Activity    Alcohol use:  Yes     Alcohol/week: 6.0 standard drinks     Types: 6 Cans of beer per week     Comment: week    Drug use: Yes     Types: Marijuana     Comment: last 7/19/2015    Sexual activity: Not on file   Other Topics Concern    Not on file   Social History Narrative    Not on file     Social Determinants of Health     Financial Resource Strain:     Difficulty of Paying Living Expenses: Not on file   Food Insecurity:     Worried About Running Out of Food in the Last Year: Not on file    Zana of Food in the Last Year: Not on file   Transportation Needs:     Lack of Transportation (Medical): Not on file    Lack of Transportation (Non-Medical): Not on file   Physical Activity:     Days of Exercise per Week: Not on file    Minutes of Exercise per Session: Not on file   Stress:     Feeling of Stress : Not on file   Social Connections:     Frequency of Communication with Friends and Family: Not on file    Frequency of Social Gatherings with Friends and Family: Not on file    Attends Confucianist Services: Not on file    Active Member of [address] [place] [address] [employer] or Organizations: Not on file    Attends Club or Organization Meetings: Not on file    Marital Status: Not on file   Intimate Partner Violence:     Fear of Current or Ex-Partner: Not on file    Emotionally Abused: Not on file    Physically Abused: Not on file    Sexually Abused: Not on file   Housing Stability:     Unable to Pay for Housing in the Last Year: Not on file    Number of [place] in the Last Year: Not on file    Unstable Housing in the Last Year: Not on file       Visit Vitals  Temp 98.2 °F (36.8 °C) (Temporal)   Ht 5' 9\" (1.753 m)   Wt 79.7 kg (175 lb 9.6 oz)   BMI 25.93 kg/m²         PHYSICAL EXAMINATION:  GENERAL: Alert and oriented x3, in no acute distress, well-developed, well-nourished, afebrile. HEART: No JVD. EYES: No scleral icterus   NECK: No significant lymphadenopathy   LUNGS: No respiratory compromise or indrawing  ABDOMEN: Soft, non-tender, non-distended. Note: This note was completed using voice recognition software. Any typographical/name errors or mistakes are unintentional.    Electronically signed by: MD CHAO Fish, Facundo Gaston M.D., have reviewed the history, physical, and have updated the allergic reactions for Wayne Og. Shahzad Braxton     TIME OUT performed immediately prior to the start of procedure:  Jey Ko M.D., have performed the following reviews on Wiliam Helms. prior to the start of the procedure:    - Patient was identified by name and date of birth  - Agreement on procedure being performed was verified  - Risks and benefits explained to the patient  -Patient was positioned for comfort  - Consent was signed and verified  - Patient was advised regarding risks of bruising, bleeding, infection and pain    Time: 8:54 AM    Body Part: intra- articular injection of left knee    Medication and Dose: 2 mL standard Orthovisc preparation, i.e. 30 mg/2mL    Date of Procedure: 06/24/22    PROCEDURE PERFORMED BY : Loli Jules M.D., [facility])    Provider Assisted by: Alma Rosa Nguyen    Patient assisted by: self    Patient tolerated procedure well with no complications

## 2022-07-01 ENCOUNTER — OFFICE VISIT (OUTPATIENT)
Dept: ORTHOPEDIC SURGERY | Age: 67
End: 2022-07-01
Payer: MEDICARE

## 2022-07-01 VITALS — HEIGHT: 69 IN | TEMPERATURE: 97.8 F | BODY MASS INDEX: 26.22 KG/M2 | WEIGHT: 177 LBS

## 2022-07-01 DIAGNOSIS — M17.12 PRIMARY OSTEOARTHRITIS OF LEFT KNEE: Primary | ICD-10-CM

## 2022-07-01 PROCEDURE — 20611 DRAIN/INJ JOINT/BURSA W/US: CPT | Performed by: PHYSICIAN ASSISTANT

## 2023-08-29 ENCOUNTER — OFFICE VISIT (OUTPATIENT)
Age: 68
End: 2023-08-29
Payer: MEDICARE

## 2023-08-29 VITALS — HEIGHT: 69 IN | WEIGHT: 173 LBS | BODY MASS INDEX: 25.62 KG/M2 | RESPIRATION RATE: 18 BRPM

## 2023-08-29 DIAGNOSIS — G89.29 OTHER CHRONIC PAIN: ICD-10-CM

## 2023-08-29 DIAGNOSIS — M70.62 TROCHANTERIC BURSITIS OF LEFT HIP: ICD-10-CM

## 2023-08-29 DIAGNOSIS — M17.12 UNILATERAL PRIMARY OSTEOARTHRITIS, LEFT KNEE: Primary | ICD-10-CM

## 2023-08-29 DIAGNOSIS — M25.562 LEFT KNEE PAIN, UNSPECIFIED CHRONICITY: ICD-10-CM

## 2023-08-29 PROCEDURE — 1123F ACP DISCUSS/DSCN MKR DOCD: CPT | Performed by: PHYSICIAN ASSISTANT

## 2023-08-29 PROCEDURE — 99214 OFFICE O/P EST MOD 30 MIN: CPT | Performed by: PHYSICIAN ASSISTANT

## 2023-08-29 PROCEDURE — 73562 X-RAY EXAM OF KNEE 3: CPT | Performed by: PHYSICIAN ASSISTANT

## 2023-08-29 PROCEDURE — G8428 CUR MEDS NOT DOCUMENT: HCPCS | Performed by: PHYSICIAN ASSISTANT

## 2023-08-29 PROCEDURE — 72170 X-RAY EXAM OF PELVIS: CPT | Performed by: PHYSICIAN ASSISTANT

## 2023-08-29 PROCEDURE — G8419 CALC BMI OUT NRM PARAM NOF/U: HCPCS | Performed by: PHYSICIAN ASSISTANT

## 2023-08-29 PROCEDURE — 3017F COLORECTAL CA SCREEN DOC REV: CPT | Performed by: PHYSICIAN ASSISTANT

## 2023-08-29 PROCEDURE — 20611 DRAIN/INJ JOINT/BURSA W/US: CPT | Performed by: PHYSICIAN ASSISTANT

## 2023-08-29 PROCEDURE — 4004F PT TOBACCO SCREEN RCVD TLK: CPT | Performed by: PHYSICIAN ASSISTANT

## 2023-08-29 RX ORDER — BETAMETHASONE SODIUM PHOSPHATE AND BETAMETHASONE ACETATE 3; 3 MG/ML; MG/ML
6 INJECTION, SUSPENSION INTRA-ARTICULAR; INTRALESIONAL; INTRAMUSCULAR; SOFT TISSUE ONCE
Status: CANCELLED | OUTPATIENT
Start: 2023-08-29 | End: 2023-08-29

## 2023-09-05 ENCOUNTER — HOSPITAL ENCOUNTER (OUTPATIENT)
Facility: HOSPITAL | Age: 68
Discharge: HOME | End: 2023-09-08
Payer: MEDICARE

## 2023-09-05 ENCOUNTER — OFFICE VISIT (OUTPATIENT)
Age: 68
End: 2023-09-05
Payer: MEDICARE

## 2023-09-05 VITALS — HEIGHT: 69 IN | WEIGHT: 175.2 LBS | BODY MASS INDEX: 25.95 KG/M2

## 2023-09-05 DIAGNOSIS — G89.29 OTHER CHRONIC PAIN: ICD-10-CM

## 2023-09-05 DIAGNOSIS — M25.562 LEFT KNEE PAIN, UNSPECIFIED CHRONICITY: ICD-10-CM

## 2023-09-05 DIAGNOSIS — M17.12 UNILATERAL PRIMARY OSTEOARTHRITIS, LEFT KNEE: Primary | ICD-10-CM

## 2023-09-05 DIAGNOSIS — M70.62 TROCHANTERIC BURSITIS OF LEFT HIP: ICD-10-CM

## 2023-09-05 LAB
CRP SERPL-MCNC: <0.3 MG/DL (ref 0–0.3)
ERYTHROCYTE [DISTWIDTH] IN BLOOD BY AUTOMATED COUNT: 12.7 % (ref 11.6–14.5)
ERYTHROCYTE [SEDIMENTATION RATE] IN BLOOD: 8 MM/HR (ref 0–20)
HCT VFR BLD AUTO: 47 % (ref 36–48)
HGB BLD-MCNC: 15.3 G/DL (ref 13–16)
MCH RBC QN AUTO: 31.5 PG (ref 24–34)
MCHC RBC AUTO-ENTMCNC: 32.6 G/DL (ref 31–37)
MCV RBC AUTO: 96.7 FL (ref 78–100)
NRBC # BLD: 0 K/UL (ref 0–0.01)
NRBC BLD-RTO: 0 PER 100 WBC
PLATELET # BLD AUTO: 317 K/UL (ref 135–420)
PMV BLD AUTO: 9.3 FL (ref 9.2–11.8)
RBC # BLD AUTO: 4.86 M/UL (ref 4.35–5.65)
URATE SERPL-MCNC: 6.8 MG/DL (ref 2.6–7.2)
WBC # BLD AUTO: 5 K/UL (ref 4.6–13.2)

## 2023-09-05 PROCEDURE — 86140 C-REACTIVE PROTEIN: CPT

## 2023-09-05 PROCEDURE — 85652 RBC SED RATE AUTOMATED: CPT

## 2023-09-05 PROCEDURE — 85027 COMPLETE CBC AUTOMATED: CPT

## 2023-09-05 PROCEDURE — 20611 DRAIN/INJ JOINT/BURSA W/US: CPT | Performed by: PHYSICIAN ASSISTANT

## 2023-09-05 PROCEDURE — 83529 ASAY OF INTERLEUKIN-6 (IL-6): CPT

## 2023-09-05 PROCEDURE — 84550 ASSAY OF BLOOD/URIC ACID: CPT

## 2023-09-05 PROCEDURE — 99024 POSTOP FOLLOW-UP VISIT: CPT | Performed by: PHYSICIAN ASSISTANT

## 2023-09-05 PROCEDURE — 36415 COLL VENOUS BLD VENIPUNCTURE: CPT

## 2023-09-07 LAB — IL6 SERPL-MCNC: 3.5 PG/ML (ref 0–13)

## 2023-09-13 ENCOUNTER — OFFICE VISIT (OUTPATIENT)
Age: 68
End: 2023-09-13
Payer: MEDICARE

## 2023-09-13 VITALS — BODY MASS INDEX: 25.92 KG/M2 | HEIGHT: 69 IN | WEIGHT: 175 LBS

## 2023-09-13 DIAGNOSIS — M70.62 TROCHANTERIC BURSITIS OF LEFT HIP: ICD-10-CM

## 2023-09-13 DIAGNOSIS — G89.29 OTHER CHRONIC PAIN: ICD-10-CM

## 2023-09-13 DIAGNOSIS — M25.562 LEFT KNEE PAIN, UNSPECIFIED CHRONICITY: ICD-10-CM

## 2023-09-13 DIAGNOSIS — M17.12 UNILATERAL PRIMARY OSTEOARTHRITIS, LEFT KNEE: Primary | ICD-10-CM

## 2023-09-13 PROCEDURE — 99024 POSTOP FOLLOW-UP VISIT: CPT | Performed by: PHYSICIAN ASSISTANT

## 2023-09-13 PROCEDURE — 20611 DRAIN/INJ JOINT/BURSA W/US: CPT | Performed by: PHYSICIAN ASSISTANT

## 2024-04-16 NOTE — TELEPHONE ENCOUNTER
Patient notified Stoney Shepherd ready for  at [facility]. 4 = No assist / stand by assistance